# Patient Record
Sex: MALE | Race: WHITE | NOT HISPANIC OR LATINO | Employment: OTHER | URBAN - METROPOLITAN AREA
[De-identification: names, ages, dates, MRNs, and addresses within clinical notes are randomized per-mention and may not be internally consistent; named-entity substitution may affect disease eponyms.]

---

## 2017-01-01 ENCOUNTER — HOSPITAL ENCOUNTER (INPATIENT)
Facility: HOSPITAL | Age: 82
LOS: 12 days | End: 2017-06-27
Attending: FAMILY MEDICINE | Admitting: FAMILY MEDICINE

## 2017-01-01 ENCOUNTER — APPOINTMENT (OUTPATIENT)
Dept: GENERAL RADIOLOGY | Facility: HOSPITAL | Age: 82
End: 2017-01-01

## 2017-01-01 ENCOUNTER — HOSPITAL ENCOUNTER (INPATIENT)
Facility: HOSPITAL | Age: 82
LOS: 4 days | End: 2017-06-15
Attending: EMERGENCY MEDICINE | Admitting: FAMILY MEDICINE

## 2017-01-01 ENCOUNTER — APPOINTMENT (OUTPATIENT)
Dept: CT IMAGING | Facility: HOSPITAL | Age: 82
End: 2017-01-01

## 2017-01-01 VITALS
TEMPERATURE: 97.6 F | OXYGEN SATURATION: 93 % | HEIGHT: 72 IN | DIASTOLIC BLOOD PRESSURE: 88 MMHG | RESPIRATION RATE: 18 BRPM | WEIGHT: 215.8 LBS | BODY MASS INDEX: 29.23 KG/M2 | SYSTOLIC BLOOD PRESSURE: 124 MMHG | HEART RATE: 60 BPM

## 2017-01-01 VITALS
SYSTOLIC BLOOD PRESSURE: 135 MMHG | DIASTOLIC BLOOD PRESSURE: 104 MMHG | TEMPERATURE: 97.9 F | WEIGHT: 191 LBS | BODY MASS INDEX: 25.87 KG/M2 | RESPIRATION RATE: 22 BRPM | OXYGEN SATURATION: 92 % | HEART RATE: 90 BPM | HEIGHT: 72 IN

## 2017-01-01 DIAGNOSIS — R09.02 HYPOXIA: Primary | ICD-10-CM

## 2017-01-01 DIAGNOSIS — Z74.09 IMPAIRED FUNCTIONAL MOBILITY, BALANCE, GAIT, AND ENDURANCE: ICD-10-CM

## 2017-01-01 DIAGNOSIS — R13.10 DYSPHAGIA, UNSPECIFIED: ICD-10-CM

## 2017-01-01 DIAGNOSIS — J18.9 PNEUMONIA OF RIGHT LOWER LOBE DUE TO INFECTIOUS ORGANISM: ICD-10-CM

## 2017-01-01 LAB
ALBUMIN SERPL-MCNC: 3.5 G/DL (ref 3.2–4.8)
ALBUMIN SERPL-MCNC: 3.7 G/DL (ref 3.2–4.8)
ALBUMIN/GLOB SERPL: 1.1 G/DL (ref 1.5–2.5)
ALBUMIN/GLOB SERPL: 1.1 G/DL (ref 1.5–2.5)
ALP SERPL-CCNC: 59 U/L (ref 25–100)
ALP SERPL-CCNC: 67 U/L (ref 25–100)
ALT SERPL W P-5'-P-CCNC: 11 U/L (ref 7–40)
ALT SERPL W P-5'-P-CCNC: 2 U/L (ref 7–40)
ANION GAP SERPL CALCULATED.3IONS-SCNC: 2 MMOL/L (ref 3–11)
ANION GAP SERPL CALCULATED.3IONS-SCNC: 3 MMOL/L (ref 3–11)
ANION GAP SERPL CALCULATED.3IONS-SCNC: 6 MMOL/L (ref 3–11)
AST SERPL-CCNC: 11 U/L (ref 0–33)
AST SERPL-CCNC: 16 U/L (ref 0–33)
BACTERIA SPEC AEROBE CULT: NORMAL
BACTERIA SPEC AEROBE CULT: NORMAL
BASOPHILS # BLD AUTO: 0.02 10*3/MM3 (ref 0–0.2)
BASOPHILS # BLD AUTO: 0.02 10*3/MM3 (ref 0–0.2)
BASOPHILS # BLD AUTO: 0.03 10*3/MM3 (ref 0–0.2)
BASOPHILS NFR BLD AUTO: 0.2 % (ref 0–1)
BASOPHILS NFR BLD AUTO: 0.2 % (ref 0–1)
BASOPHILS NFR BLD AUTO: 0.3 % (ref 0–1)
BILIRUB SERPL-MCNC: 0.8 MG/DL (ref 0.3–1.2)
BILIRUB SERPL-MCNC: 1 MG/DL (ref 0.3–1.2)
BILIRUB UR QL STRIP: ABNORMAL
BNP SERPL-MCNC: 18 PG/ML (ref 0–100)
BUN BLD-MCNC: 12 MG/DL (ref 9–23)
BUN BLD-MCNC: 15 MG/DL (ref 9–23)
BUN BLD-MCNC: 9 MG/DL (ref 9–23)
BUN/CREAT SERPL: 12.9 (ref 7–25)
BUN/CREAT SERPL: 15 (ref 7–25)
BUN/CREAT SERPL: 18.8 (ref 7–25)
CALCIUM SPEC-SCNC: 8.7 MG/DL (ref 8.7–10.4)
CALCIUM SPEC-SCNC: 9 MG/DL (ref 8.7–10.4)
CALCIUM SPEC-SCNC: 9.1 MG/DL (ref 8.7–10.4)
CHLORIDE SERPL-SCNC: 104 MMOL/L (ref 99–109)
CHLORIDE SERPL-SCNC: 107 MMOL/L (ref 99–109)
CHLORIDE SERPL-SCNC: 108 MMOL/L (ref 99–109)
CLARITY UR: CLEAR
CO2 SERPL-SCNC: 28 MMOL/L (ref 20–31)
CO2 SERPL-SCNC: 28 MMOL/L (ref 20–31)
CO2 SERPL-SCNC: 31 MMOL/L (ref 20–31)
COLOR UR: ABNORMAL
CREAT BLD-MCNC: 0.7 MG/DL (ref 0.6–1.3)
CREAT BLD-MCNC: 0.8 MG/DL (ref 0.6–1.3)
CREAT BLD-MCNC: 0.8 MG/DL (ref 0.6–1.3)
D-LACTATE SERPL-SCNC: 1.1 MMOL/L (ref 0.5–2)
DEPRECATED RDW RBC AUTO: 48 FL (ref 37–54)
DEPRECATED RDW RBC AUTO: 48.5 FL (ref 37–54)
DEPRECATED RDW RBC AUTO: 48.8 FL (ref 37–54)
EOSINOPHIL # BLD AUTO: 0.24 10*3/MM3 (ref 0.1–0.3)
EOSINOPHIL # BLD AUTO: 0.29 10*3/MM3 (ref 0.1–0.3)
EOSINOPHIL # BLD AUTO: 0.5 10*3/MM3 (ref 0.1–0.3)
EOSINOPHIL NFR BLD AUTO: 2.1 % (ref 0–3)
EOSINOPHIL NFR BLD AUTO: 2.7 % (ref 0–3)
EOSINOPHIL NFR BLD AUTO: 5.1 % (ref 0–3)
ERYTHROCYTE [DISTWIDTH] IN BLOOD BY AUTOMATED COUNT: 13.5 % (ref 11.3–14.5)
ERYTHROCYTE [DISTWIDTH] IN BLOOD BY AUTOMATED COUNT: 13.5 % (ref 11.3–14.5)
ERYTHROCYTE [DISTWIDTH] IN BLOOD BY AUTOMATED COUNT: 13.7 % (ref 11.3–14.5)
GFR SERPL CREATININE-BSD FRML MDRD: 106 ML/MIN/1.73
GFR SERPL CREATININE-BSD FRML MDRD: 91 ML/MIN/1.73
GFR SERPL CREATININE-BSD FRML MDRD: 91 ML/MIN/1.73
GLOBULIN UR ELPH-MCNC: 3.3 GM/DL
GLOBULIN UR ELPH-MCNC: 3.3 GM/DL
GLUCOSE BLD-MCNC: 129 MG/DL (ref 70–100)
GLUCOSE BLD-MCNC: 87 MG/DL (ref 70–100)
GLUCOSE BLD-MCNC: 88 MG/DL (ref 70–100)
GLUCOSE UR STRIP-MCNC: NEGATIVE MG/DL
HCT VFR BLD AUTO: 38.9 % (ref 38.9–50.9)
HCT VFR BLD AUTO: 40.3 % (ref 38.9–50.9)
HCT VFR BLD AUTO: 42.1 % (ref 38.9–50.9)
HGB BLD-MCNC: 12.5 G/DL (ref 13.1–17.5)
HGB BLD-MCNC: 13.4 G/DL (ref 13.1–17.5)
HGB BLD-MCNC: 13.6 G/DL (ref 13.1–17.5)
HGB UR QL STRIP.AUTO: NEGATIVE
IMM GRANULOCYTES # BLD: 0.01 10*3/MM3 (ref 0–0.03)
IMM GRANULOCYTES # BLD: 0.02 10*3/MM3 (ref 0–0.03)
IMM GRANULOCYTES # BLD: 0.03 10*3/MM3 (ref 0–0.03)
IMM GRANULOCYTES NFR BLD: 0.1 % (ref 0–0.6)
IMM GRANULOCYTES NFR BLD: 0.2 % (ref 0–0.6)
IMM GRANULOCYTES NFR BLD: 0.3 % (ref 0–0.6)
INR PPP: 1.11
KETONES UR QL STRIP: ABNORMAL
LEUKOCYTE ESTERASE UR QL STRIP.AUTO: NEGATIVE
LYMPHOCYTES # BLD AUTO: 2 10*3/MM3 (ref 0.6–4.8)
LYMPHOCYTES # BLD AUTO: 2.5 10*3/MM3 (ref 0.6–4.8)
LYMPHOCYTES # BLD AUTO: 2.96 10*3/MM3 (ref 0.6–4.8)
LYMPHOCYTES NFR BLD AUTO: 17.1 % (ref 24–44)
LYMPHOCYTES NFR BLD AUTO: 25.3 % (ref 24–44)
LYMPHOCYTES NFR BLD AUTO: 27.1 % (ref 24–44)
MAGNESIUM SERPL-MCNC: 1.8 MG/DL (ref 1.3–2.7)
MCH RBC QN AUTO: 31.4 PG (ref 27–31)
MCH RBC QN AUTO: 31.9 PG (ref 27–31)
MCH RBC QN AUTO: 32.1 PG (ref 27–31)
MCHC RBC AUTO-ENTMCNC: 32.1 G/DL (ref 32–36)
MCHC RBC AUTO-ENTMCNC: 32.3 G/DL (ref 32–36)
MCHC RBC AUTO-ENTMCNC: 33.3 G/DL (ref 32–36)
MCV RBC AUTO: 96.6 FL (ref 80–99)
MCV RBC AUTO: 97.7 FL (ref 80–99)
MCV RBC AUTO: 98.6 FL (ref 80–99)
MONOCYTES # BLD AUTO: 0.56 10*3/MM3 (ref 0–1)
MONOCYTES # BLD AUTO: 0.6 10*3/MM3 (ref 0–1)
MONOCYTES # BLD AUTO: 0.8 10*3/MM3 (ref 0–1)
MONOCYTES NFR BLD AUTO: 5.1 % (ref 0–12)
MONOCYTES NFR BLD AUTO: 5.1 % (ref 0–12)
MONOCYTES NFR BLD AUTO: 8.1 % (ref 0–12)
NEUTROPHILS # BLD AUTO: 6.05 10*3/MM3 (ref 1.5–8.3)
NEUTROPHILS # BLD AUTO: 7.06 10*3/MM3 (ref 1.5–8.3)
NEUTROPHILS # BLD AUTO: 8.81 10*3/MM3 (ref 1.5–8.3)
NEUTROPHILS NFR BLD AUTO: 61.1 % (ref 41–71)
NEUTROPHILS NFR BLD AUTO: 64.7 % (ref 41–71)
NEUTROPHILS NFR BLD AUTO: 75.2 % (ref 41–71)
NITRITE UR QL STRIP: NEGATIVE
PH UR STRIP.AUTO: 5.5 [PH] (ref 5–8)
PLATELET # BLD AUTO: 139 10*3/MM3 (ref 150–450)
PLATELET # BLD AUTO: 153 10*3/MM3 (ref 150–450)
PLATELET # BLD AUTO: 178 10*3/MM3 (ref 150–450)
PMV BLD AUTO: 8.9 FL (ref 6–12)
PMV BLD AUTO: 9 FL (ref 6–12)
PMV BLD AUTO: 9 FL (ref 6–12)
POTASSIUM BLD-SCNC: 3.6 MMOL/L (ref 3.5–5.5)
POTASSIUM BLD-SCNC: 3.6 MMOL/L (ref 3.5–5.5)
POTASSIUM BLD-SCNC: 3.7 MMOL/L (ref 3.5–5.5)
PROT SERPL-MCNC: 6.8 G/DL (ref 5.7–8.2)
PROT SERPL-MCNC: 7 G/DL (ref 5.7–8.2)
PROT UR QL STRIP: NEGATIVE
PROTHROMBIN TIME: 12.1 SECONDS (ref 9.6–11.5)
RBC # BLD AUTO: 3.98 10*6/MM3 (ref 4.2–5.76)
RBC # BLD AUTO: 4.17 10*6/MM3 (ref 4.2–5.76)
RBC # BLD AUTO: 4.27 10*6/MM3 (ref 4.2–5.76)
SODIUM BLD-SCNC: 138 MMOL/L (ref 132–146)
SODIUM BLD-SCNC: 138 MMOL/L (ref 132–146)
SODIUM BLD-SCNC: 141 MMOL/L (ref 132–146)
SP GR UR STRIP: 1.03 (ref 1–1.03)
TROPONIN I SERPL-MCNC: 0 NG/ML (ref 0–0.07)
UROBILINOGEN UR QL STRIP: ABNORMAL
VANCOMYCIN TROUGH SERPL-MCNC: 14.3 MCG/ML (ref 10–20)
WBC NRBC COR # BLD: 10.91 10*3/MM3 (ref 3.5–10.8)
WBC NRBC COR # BLD: 11.7 10*3/MM3 (ref 3.5–10.8)
WBC NRBC COR # BLD: 9.89 10*3/MM3 (ref 3.5–10.8)

## 2017-01-01 PROCEDURE — 25010000002 MORPHINE SULFATE (PF) 2 MG/ML SOLUTION: Performed by: NURSE PRACTITIONER

## 2017-01-01 PROCEDURE — 25010000002 ENOXAPARIN PER 10 MG

## 2017-01-01 PROCEDURE — 25010000002 KETOROLAC TROMETHAMINE PER 15 MG: Performed by: NURSE PRACTITIONER

## 2017-01-01 PROCEDURE — 25010000002 VANCOMYCIN

## 2017-01-01 PROCEDURE — 25010000002 LORAZEPAM PER 2 MG: Performed by: NURSE PRACTITIONER

## 2017-01-01 PROCEDURE — 94799 UNLISTED PULMONARY SVC/PX: CPT

## 2017-01-01 PROCEDURE — 99238 HOSP IP/OBS DSCHRG MGMT 30/<: CPT | Performed by: FAMILY MEDICINE

## 2017-01-01 PROCEDURE — 94640 AIRWAY INHALATION TREATMENT: CPT

## 2017-01-01 PROCEDURE — 99232 SBSQ HOSP IP/OBS MODERATE 35: CPT | Performed by: FAMILY MEDICINE

## 2017-01-01 PROCEDURE — 25010000002 PIPERACILLIN-TAZOBACTAM: Performed by: INTERNAL MEDICINE

## 2017-01-01 PROCEDURE — 92526 ORAL FUNCTION THERAPY: CPT

## 2017-01-01 PROCEDURE — 99285 EMERGENCY DEPT VISIT HI MDM: CPT

## 2017-01-01 PROCEDURE — G8978 MOBILITY CURRENT STATUS: HCPCS

## 2017-01-01 PROCEDURE — 70450 CT HEAD/BRAIN W/O DYE: CPT

## 2017-01-01 PROCEDURE — 87040 BLOOD CULTURE FOR BACTERIA: CPT | Performed by: PHYSICIAN ASSISTANT

## 2017-01-01 PROCEDURE — 94760 N-INVAS EAR/PLS OXIMETRY 1: CPT

## 2017-01-01 PROCEDURE — 81003 URINALYSIS AUTO W/O SCOPE: CPT | Performed by: NURSE PRACTITIONER

## 2017-01-01 PROCEDURE — 92610 EVALUATE SWALLOWING FUNCTION: CPT

## 2017-01-01 PROCEDURE — P9612 CATHETERIZE FOR URINE SPEC: HCPCS

## 2017-01-01 PROCEDURE — 85025 COMPLETE CBC W/AUTO DIFF WBC: CPT | Performed by: PHYSICIAN ASSISTANT

## 2017-01-01 PROCEDURE — 71010 HC CHEST PA OR AP: CPT

## 2017-01-01 PROCEDURE — G8979 MOBILITY GOAL STATUS: HCPCS

## 2017-01-01 PROCEDURE — 83735 ASSAY OF MAGNESIUM: CPT | Performed by: FAMILY MEDICINE

## 2017-01-01 PROCEDURE — 93005 ELECTROCARDIOGRAM TRACING: CPT | Performed by: PHYSICIAN ASSISTANT

## 2017-01-01 PROCEDURE — 85025 COMPLETE CBC W/AUTO DIFF WBC: CPT | Performed by: FAMILY MEDICINE

## 2017-01-01 PROCEDURE — 80202 ASSAY OF VANCOMYCIN: CPT

## 2017-01-01 PROCEDURE — 85025 COMPLETE CBC W/AUTO DIFF WBC: CPT | Performed by: INTERNAL MEDICINE

## 2017-01-01 PROCEDURE — 36415 COLL VENOUS BLD VENIPUNCTURE: CPT

## 2017-01-01 PROCEDURE — 85610 PROTHROMBIN TIME: CPT | Performed by: PHYSICIAN ASSISTANT

## 2017-01-01 PROCEDURE — 83605 ASSAY OF LACTIC ACID: CPT | Performed by: PHYSICIAN ASSISTANT

## 2017-01-01 PROCEDURE — 80048 BASIC METABOLIC PNL TOTAL CA: CPT | Performed by: FAMILY MEDICINE

## 2017-01-01 PROCEDURE — 84484 ASSAY OF TROPONIN QUANT: CPT

## 2017-01-01 PROCEDURE — 99223 1ST HOSP IP/OBS HIGH 75: CPT | Performed by: INTERNAL MEDICINE

## 2017-01-01 PROCEDURE — 80053 COMPREHEN METABOLIC PANEL: CPT | Performed by: PHYSICIAN ASSISTANT

## 2017-01-01 PROCEDURE — 25010000002 PIPERACILLIN-TAZOBACTAM: Performed by: PHYSICIAN ASSISTANT

## 2017-01-01 PROCEDURE — 83880 ASSAY OF NATRIURETIC PEPTIDE: CPT | Performed by: PHYSICIAN ASSISTANT

## 2017-01-01 PROCEDURE — 80053 COMPREHEN METABOLIC PANEL: CPT | Performed by: INTERNAL MEDICINE

## 2017-01-01 PROCEDURE — 99232 SBSQ HOSP IP/OBS MODERATE 35: CPT | Performed by: NURSE PRACTITIONER

## 2017-01-01 PROCEDURE — 97162 PT EVAL MOD COMPLEX 30 MIN: CPT

## 2017-01-01 PROCEDURE — 25010000002 VANCOMYCIN HCL IN NACL 2-0.9 GM/500ML-% SOLUTION: Performed by: PHYSICIAN ASSISTANT

## 2017-01-01 RX ORDER — SODIUM CHLORIDE 0.9 % (FLUSH) 0.9 %
1-10 SYRINGE (ML) INJECTION AS NEEDED
Status: CANCELLED | OUTPATIENT
Start: 2017-01-01

## 2017-01-01 RX ORDER — GLYCOPYRROLATE 0.2 MG/ML
0.4 INJECTION INTRAMUSCULAR; INTRAVENOUS ONCE
Status: DISCONTINUED | OUTPATIENT
Start: 2017-01-01 | End: 2017-01-01 | Stop reason: HOSPADM

## 2017-01-01 RX ORDER — NIACIN 100 MG
100 TABLET ORAL
COMMUNITY

## 2017-01-01 RX ORDER — DOCUSATE SODIUM 100 MG/1
100 CAPSULE, LIQUID FILLED ORAL 2 TIMES DAILY
Status: DISCONTINUED | OUTPATIENT
Start: 2017-01-01 | End: 2017-01-01

## 2017-01-01 RX ORDER — LORAZEPAM 2 MG/ML
0.25 INJECTION INTRAMUSCULAR EVERY 4 HOURS PRN
Status: DISCONTINUED | OUTPATIENT
Start: 2017-01-01 | End: 2017-01-01 | Stop reason: HOSPADM

## 2017-01-01 RX ORDER — FAMOTIDINE 20 MG/1
20 TABLET, FILM COATED ORAL 2 TIMES DAILY
Status: DISCONTINUED | OUTPATIENT
Start: 2017-01-01 | End: 2017-01-01

## 2017-01-01 RX ORDER — DOCUSATE SODIUM 100 MG/1
100 CAPSULE, LIQUID FILLED ORAL 2 TIMES DAILY
Status: DISCONTINUED | OUTPATIENT
Start: 2017-01-01 | End: 2017-01-01 | Stop reason: HOSPADM

## 2017-01-01 RX ORDER — ACETAMINOPHEN 325 MG/1
650 TABLET ORAL EVERY 6 HOURS PRN
Status: DISCONTINUED | OUTPATIENT
Start: 2017-01-01 | End: 2017-01-01 | Stop reason: HOSPADM

## 2017-01-01 RX ORDER — ASPIRIN 81 MG/1
81 TABLET, CHEWABLE ORAL DAILY
COMMUNITY

## 2017-01-01 RX ORDER — POLYVINYL ALCOHOL 14 MG/ML
2 SOLUTION/ DROPS OPHTHALMIC 2 TIMES DAILY
Status: DISCONTINUED | OUTPATIENT
Start: 2017-01-01 | End: 2017-01-01 | Stop reason: HOSPADM

## 2017-01-01 RX ORDER — GLYCOPYRROLATE 0.2 MG/ML
0.4 INJECTION INTRAMUSCULAR; INTRAVENOUS EVERY 8 HOURS SCHEDULED
Status: DISCONTINUED | OUTPATIENT
Start: 2017-01-01 | End: 2017-01-01 | Stop reason: HOSPADM

## 2017-01-01 RX ORDER — MEMANTINE HYDROCHLORIDE 10 MG/1
10 TABLET ORAL 2 TIMES DAILY
Status: DISCONTINUED | OUTPATIENT
Start: 2017-01-01 | End: 2017-01-01

## 2017-01-01 RX ORDER — ONDANSETRON 4 MG/1
4 TABLET, FILM COATED ORAL EVERY 6 HOURS PRN
Status: DISCONTINUED | OUTPATIENT
Start: 2017-01-01 | End: 2017-01-01 | Stop reason: HOSPADM

## 2017-01-01 RX ORDER — VANCOMYCIN 2 GRAM/500 ML IN 0.9 % SODIUM CHLORIDE INTRAVENOUS
20 ONCE
Status: COMPLETED | OUTPATIENT
Start: 2017-01-01 | End: 2017-01-01

## 2017-01-01 RX ORDER — DONEPEZIL HYDROCHLORIDE 10 MG/1
10 TABLET, FILM COATED ORAL NIGHTLY
COMMUNITY

## 2017-01-01 RX ORDER — NYSTATIN 100000 [USP'U]/G
POWDER TOPICAL EVERY 8 HOURS SCHEDULED
Status: DISCONTINUED | OUTPATIENT
Start: 2017-01-01 | End: 2017-01-01 | Stop reason: HOSPADM

## 2017-01-01 RX ORDER — CITALOPRAM 20 MG/1
20 TABLET ORAL DAILY
Status: CANCELLED | OUTPATIENT
Start: 2017-01-01

## 2017-01-01 RX ORDER — MORPHINE SULFATE 2 MG/ML
2 INJECTION, SOLUTION INTRAMUSCULAR; INTRAVENOUS
Status: DISCONTINUED | OUTPATIENT
Start: 2017-01-01 | End: 2017-01-01 | Stop reason: HOSPADM

## 2017-01-01 RX ORDER — FINASTERIDE 5 MG/1
5 TABLET, FILM COATED ORAL DAILY
COMMUNITY

## 2017-01-01 RX ORDER — ONDANSETRON 2 MG/ML
4 INJECTION INTRAMUSCULAR; INTRAVENOUS EVERY 6 HOURS PRN
Status: DISCONTINUED | OUTPATIENT
Start: 2017-01-01 | End: 2017-01-01 | Stop reason: HOSPADM

## 2017-01-01 RX ORDER — ACETAMINOPHEN 325 MG/1
650 TABLET ORAL EVERY 6 HOURS PRN
Status: CANCELLED | OUTPATIENT
Start: 2017-01-01

## 2017-01-01 RX ORDER — MORPHINE SULFATE 2 MG/ML
2 INJECTION, SOLUTION INTRAMUSCULAR; INTRAVENOUS EVERY 4 HOURS
Status: DISCONTINUED | OUTPATIENT
Start: 2017-01-01 | End: 2017-01-01

## 2017-01-01 RX ORDER — DOCUSATE SODIUM 100 MG/1
100 CAPSULE, LIQUID FILLED ORAL 2 TIMES DAILY
Status: CANCELLED | OUTPATIENT
Start: 2017-01-01

## 2017-01-01 RX ORDER — ASPIRIN 81 MG/1
81 TABLET, CHEWABLE ORAL DAILY
Status: DISCONTINUED | OUTPATIENT
Start: 2017-01-01 | End: 2017-01-01

## 2017-01-01 RX ORDER — MEMANTINE HYDROCHLORIDE 10 MG/1
10 TABLET ORAL 2 TIMES DAILY
COMMUNITY

## 2017-01-01 RX ORDER — IPRATROPIUM BROMIDE AND ALBUTEROL SULFATE 2.5; .5 MG/3ML; MG/3ML
3 SOLUTION RESPIRATORY (INHALATION) ONCE
Status: COMPLETED | OUTPATIENT
Start: 2017-01-01 | End: 2017-01-01

## 2017-01-01 RX ORDER — ACETAMINOPHEN 650 MG/1
650 SUPPOSITORY RECTAL EVERY 6 HOURS PRN
Status: DISCONTINUED | OUTPATIENT
Start: 2017-01-01 | End: 2017-01-01 | Stop reason: HOSPADM

## 2017-01-01 RX ORDER — TERAZOSIN 2 MG/1
2 CAPSULE ORAL NIGHTLY
Status: DISCONTINUED | OUTPATIENT
Start: 2017-01-01 | End: 2017-01-01 | Stop reason: HOSPADM

## 2017-01-01 RX ORDER — VANCOMYCIN 2 GRAM/500 ML IN 0.9 % SODIUM CHLORIDE INTRAVENOUS
20 ONCE
Status: DISCONTINUED | OUTPATIENT
Start: 2017-01-01 | End: 2017-01-01

## 2017-01-01 RX ORDER — ONDANSETRON 4 MG/1
4 TABLET, FILM COATED ORAL EVERY 6 HOURS PRN
Status: CANCELLED | OUTPATIENT
Start: 2017-01-01

## 2017-01-01 RX ORDER — IPRATROPIUM BROMIDE AND ALBUTEROL SULFATE 2.5; .5 MG/3ML; MG/3ML
3 SOLUTION RESPIRATORY (INHALATION) EVERY 6 HOURS PRN
Status: DISCONTINUED | OUTPATIENT
Start: 2017-01-01 | End: 2017-01-01 | Stop reason: HOSPADM

## 2017-01-01 RX ORDER — SODIUM CHLORIDE 0.9 % (FLUSH) 0.9 %
10 SYRINGE (ML) INJECTION AS NEEDED
Status: DISCONTINUED | OUTPATIENT
Start: 2017-01-01 | End: 2017-01-01 | Stop reason: HOSPADM

## 2017-01-01 RX ORDER — MORPHINE SULFATE 2 MG/ML
2 INJECTION, SOLUTION INTRAMUSCULAR; INTRAVENOUS 2 TIMES DAILY
Status: DISCONTINUED | OUTPATIENT
Start: 2017-01-01 | End: 2017-01-01

## 2017-01-01 RX ORDER — MORPHINE SULFATE 2 MG/ML
2 INJECTION, SOLUTION INTRAMUSCULAR; INTRAVENOUS
Status: CANCELLED | OUTPATIENT
Start: 2017-01-01 | End: 2017-01-01

## 2017-01-01 RX ORDER — BISACODYL 10 MG
10 SUPPOSITORY, RECTAL RECTAL DAILY
Status: DISCONTINUED | OUTPATIENT
Start: 2017-01-01 | End: 2017-01-01

## 2017-01-01 RX ORDER — ONDANSETRON 2 MG/ML
4 INJECTION INTRAMUSCULAR; INTRAVENOUS EVERY 6 HOURS PRN
Status: CANCELLED | OUTPATIENT
Start: 2017-01-01

## 2017-01-01 RX ORDER — BISACODYL 10 MG
10 SUPPOSITORY, RECTAL RECTAL DAILY PRN
Status: DISCONTINUED | OUTPATIENT
Start: 2017-01-01 | End: 2017-01-01

## 2017-01-01 RX ORDER — BISACODYL 10 MG
10 SUPPOSITORY, RECTAL RECTAL DAILY PRN
Status: DISCONTINUED | OUTPATIENT
Start: 2017-01-01 | End: 2017-01-01 | Stop reason: HOSPADM

## 2017-01-01 RX ORDER — SODIUM CHLORIDE 9 MG/ML
50 INJECTION, SOLUTION INTRAVENOUS CONTINUOUS
Status: ACTIVE | OUTPATIENT
Start: 2017-01-01 | End: 2017-01-01

## 2017-01-01 RX ORDER — KETOROLAC TROMETHAMINE 15 MG/ML
15 INJECTION, SOLUTION INTRAMUSCULAR; INTRAVENOUS EVERY 6 HOURS PRN
Status: DISCONTINUED | OUTPATIENT
Start: 2017-01-01 | End: 2017-01-01

## 2017-01-01 RX ORDER — MORPHINE SULFATE 2 MG/ML
2 INJECTION, SOLUTION INTRAMUSCULAR; INTRAVENOUS EVERY 4 HOURS
Status: DISCONTINUED | OUTPATIENT
Start: 2017-01-01 | End: 2017-01-01 | Stop reason: HOSPADM

## 2017-01-01 RX ORDER — DONEPEZIL HYDROCHLORIDE 10 MG/1
10 TABLET, FILM COATED ORAL NIGHTLY
Status: DISCONTINUED | OUTPATIENT
Start: 2017-01-01 | End: 2017-01-01

## 2017-01-01 RX ORDER — IPRATROPIUM BROMIDE AND ALBUTEROL SULFATE 2.5; .5 MG/3ML; MG/3ML
3 SOLUTION RESPIRATORY (INHALATION) EVERY 6 HOURS PRN
Status: CANCELLED | OUTPATIENT
Start: 2017-01-01

## 2017-01-01 RX ORDER — LORAZEPAM 2 MG/ML
0.25 INJECTION INTRAMUSCULAR EVERY 6 HOURS
Status: DISCONTINUED | OUTPATIENT
Start: 2017-01-01 | End: 2017-01-01 | Stop reason: HOSPADM

## 2017-01-01 RX ORDER — CITALOPRAM 20 MG/1
20 TABLET ORAL DAILY
Status: DISCONTINUED | OUTPATIENT
Start: 2017-01-01 | End: 2017-01-01 | Stop reason: HOSPADM

## 2017-01-01 RX ORDER — GLYCOPYRROLATE 0.2 MG/ML
0.4 INJECTION INTRAMUSCULAR; INTRAVENOUS EVERY 4 HOURS PRN
Status: DISCONTINUED | OUTPATIENT
Start: 2017-01-01 | End: 2017-01-01 | Stop reason: HOSPADM

## 2017-01-01 RX ORDER — LORAZEPAM 2 MG/ML
0.25 INJECTION INTRAMUSCULAR EVERY 4 HOURS PRN
Status: CANCELLED | OUTPATIENT
Start: 2017-01-01 | End: 2017-01-01

## 2017-01-01 RX ORDER — ACETAMINOPHEN 650 MG/1
650 SUPPOSITORY RECTAL EVERY 6 HOURS PRN
Status: CANCELLED | OUTPATIENT
Start: 2017-01-01

## 2017-01-01 RX ORDER — SODIUM CHLORIDE 0.9 % (FLUSH) 0.9 %
1-10 SYRINGE (ML) INJECTION AS NEEDED
Status: DISCONTINUED | OUTPATIENT
Start: 2017-01-01 | End: 2017-01-01 | Stop reason: HOSPADM

## 2017-01-01 RX ORDER — KETOROLAC TROMETHAMINE 15 MG/ML
15 INJECTION, SOLUTION INTRAMUSCULAR; INTRAVENOUS EVERY 6 HOURS PRN
Status: DISPENSED | OUTPATIENT
Start: 2017-01-01 | End: 2017-01-01

## 2017-01-01 RX ORDER — TERAZOSIN 2 MG/1
2 CAPSULE ORAL NIGHTLY
Status: CANCELLED | OUTPATIENT
Start: 2017-01-01

## 2017-01-01 RX ORDER — GLYCOPYRROLATE 0.2 MG/ML
0.4 INJECTION INTRAMUSCULAR; INTRAVENOUS EVERY 4 HOURS PRN
Status: CANCELLED | OUTPATIENT
Start: 2017-01-01

## 2017-01-01 RX ORDER — SODIUM CHLORIDE 9 MG/ML
100 INJECTION, SOLUTION INTRAVENOUS CONTINUOUS
Status: DISCONTINUED | OUTPATIENT
Start: 2017-01-01 | End: 2017-01-01

## 2017-01-01 RX ORDER — TERAZOSIN 2 MG/1
2 CAPSULE ORAL NIGHTLY
Status: DISCONTINUED | OUTPATIENT
Start: 2017-01-01 | End: 2017-01-01

## 2017-01-01 RX ORDER — MORPHINE SULFATE 2 MG/ML
2 INJECTION, SOLUTION INTRAMUSCULAR; INTRAVENOUS DAILY
Status: DISCONTINUED | OUTPATIENT
Start: 2017-01-01 | End: 2017-01-01

## 2017-01-01 RX ORDER — CITALOPRAM 20 MG/1
20 TABLET ORAL DAILY
COMMUNITY

## 2017-01-01 RX ORDER — SODIUM CHLORIDE 0.9 % (FLUSH) 0.9 %
10 SYRINGE (ML) INJECTION AS NEEDED
Status: CANCELLED | OUTPATIENT
Start: 2017-01-01

## 2017-01-01 RX ORDER — DOXAZOSIN 2 MG/1
2 TABLET ORAL NIGHTLY
COMMUNITY

## 2017-01-01 RX ORDER — ACETAMINOPHEN 325 MG/1
650 TABLET ORAL EVERY 4 HOURS PRN
COMMUNITY

## 2017-01-01 RX ORDER — FINASTERIDE 5 MG/1
5 TABLET, FILM COATED ORAL DAILY
Status: DISCONTINUED | OUTPATIENT
Start: 2017-01-01 | End: 2017-01-01

## 2017-01-01 RX ADMIN — POLYVINYL ALCOHOL 2 DROP: 14 SOLUTION/ DROPS OPHTHALMIC at 08:25

## 2017-01-01 RX ADMIN — Medication: at 01:00

## 2017-01-01 RX ADMIN — MORPHINE SULFATE 2 MG: 2 INJECTION, SOLUTION INTRAMUSCULAR; INTRAVENOUS at 22:48

## 2017-01-01 RX ADMIN — TAZOBACTAM SODIUM AND PIPERACILLIN SODIUM 4.5 G: .5; 4 INJECTION, POWDER, LYOPHILIZED, FOR SOLUTION INTRAVENOUS at 17:02

## 2017-01-01 RX ADMIN — VANCOMYCIN HYDROCHLORIDE 1500 MG: 1 INJECTION, POWDER, LYOPHILIZED, FOR SOLUTION INTRAVENOUS at 05:55

## 2017-01-01 RX ADMIN — Medication: at 16:56

## 2017-01-01 RX ADMIN — LORAZEPAM 0.25 MG: 2 INJECTION INTRAMUSCULAR; INTRAVENOUS at 02:31

## 2017-01-01 RX ADMIN — LORAZEPAM 0.25 MG: 2 INJECTION INTRAMUSCULAR; INTRAVENOUS at 08:11

## 2017-01-01 RX ADMIN — SODIUM CHLORIDE 100 ML/HR: 9 INJECTION, SOLUTION INTRAVENOUS at 23:00

## 2017-01-01 RX ADMIN — ROBINUL 0.4 MG: 0.2 INJECTION INTRAMUSCULAR; INTRAVENOUS at 06:31

## 2017-01-01 RX ADMIN — POLYVINYL ALCOHOL 2 DROP: 14 SOLUTION/ DROPS OPHTHALMIC at 09:02

## 2017-01-01 RX ADMIN — Medication: at 06:43

## 2017-01-01 RX ADMIN — MORPHINE SULFATE 2 MG: 2 INJECTION, SOLUTION INTRAMUSCULAR; INTRAVENOUS at 08:23

## 2017-01-01 RX ADMIN — ROBINUL 0.4 MG: 0.2 INJECTION INTRAMUSCULAR; INTRAVENOUS at 15:57

## 2017-01-01 RX ADMIN — Medication: at 13:14

## 2017-01-01 RX ADMIN — Medication: at 19:27

## 2017-01-01 RX ADMIN — ROBINUL 0.4 MG: 0.2 INJECTION INTRAMUSCULAR; INTRAVENOUS at 14:52

## 2017-01-01 RX ADMIN — NYSTATIN: 100000 POWDER TOPICAL at 09:01

## 2017-01-01 RX ADMIN — Medication: at 04:53

## 2017-01-01 RX ADMIN — POLYVINYL ALCOHOL 2 DROP: 14 SOLUTION/ DROPS OPHTHALMIC at 08:10

## 2017-01-01 RX ADMIN — POLYVINYL ALCOHOL 2 DROP: 14 SOLUTION/ DROPS OPHTHALMIC at 08:12

## 2017-01-01 RX ADMIN — BISACODYL 10 MG: 10 SUPPOSITORY RECTAL at 08:28

## 2017-01-01 RX ADMIN — LORAZEPAM 0.25 MG: 2 INJECTION INTRAMUSCULAR; INTRAVENOUS at 22:00

## 2017-01-01 RX ADMIN — ROBINUL 0.4 MG: 0.2 INJECTION INTRAMUSCULAR; INTRAVENOUS at 06:30

## 2017-01-01 RX ADMIN — ROBINUL 0.4 MG: 0.2 INJECTION INTRAMUSCULAR; INTRAVENOUS at 05:27

## 2017-01-01 RX ADMIN — BISACODYL 10 MG: 10 SUPPOSITORY RECTAL at 08:10

## 2017-01-01 RX ADMIN — MORPHINE SULFATE 2 MG: 2 INJECTION, SOLUTION INTRAMUSCULAR; INTRAVENOUS at 16:00

## 2017-01-01 RX ADMIN — Medication: at 05:17

## 2017-01-01 RX ADMIN — MORPHINE SULFATE 2 MG: 2 INJECTION, SOLUTION INTRAMUSCULAR; INTRAVENOUS at 15:05

## 2017-01-01 RX ADMIN — Medication: at 17:56

## 2017-01-01 RX ADMIN — Medication: at 16:32

## 2017-01-01 RX ADMIN — Medication: at 23:52

## 2017-01-01 RX ADMIN — MORPHINE SULFATE 2 MG: 2 INJECTION, SOLUTION INTRAMUSCULAR; INTRAVENOUS at 11:36

## 2017-01-01 RX ADMIN — MORPHINE SULFATE 2 MG: 2 INJECTION, SOLUTION INTRAMUSCULAR; INTRAVENOUS at 14:51

## 2017-01-01 RX ADMIN — ENOXAPARIN SODIUM 40 MG: 40 INJECTION SUBCUTANEOUS at 05:00

## 2017-01-01 RX ADMIN — ROBINUL 0.4 MG: 0.2 INJECTION INTRAMUSCULAR; INTRAVENOUS at 06:29

## 2017-01-01 RX ADMIN — NYSTATIN: 100000 POWDER TOPICAL at 22:00

## 2017-01-01 RX ADMIN — MORPHINE SULFATE 2 MG: 2 INJECTION, SOLUTION INTRAMUSCULAR; INTRAVENOUS at 08:10

## 2017-01-01 RX ADMIN — MORPHINE SULFATE 2 MG: 2 INJECTION, SOLUTION INTRAMUSCULAR; INTRAVENOUS at 10:41

## 2017-01-01 RX ADMIN — Medication: at 23:23

## 2017-01-01 RX ADMIN — MORPHINE SULFATE 2 MG: 2 INJECTION, SOLUTION INTRAMUSCULAR; INTRAVENOUS at 06:48

## 2017-01-01 RX ADMIN — MORPHINE SULFATE 2 MG: 2 INJECTION, SOLUTION INTRAMUSCULAR; INTRAVENOUS at 14:04

## 2017-01-01 RX ADMIN — TAZOBACTAM SODIUM AND PIPERACILLIN SODIUM 4.5 G: .5; 4 INJECTION, POWDER, LYOPHILIZED, FOR SOLUTION INTRAVENOUS at 18:03

## 2017-01-01 RX ADMIN — LORAZEPAM 0.25 MG: 2 INJECTION INTRAMUSCULAR; INTRAVENOUS at 06:30

## 2017-01-01 RX ADMIN — SODIUM CHLORIDE 125 ML/HR: 9 INJECTION, SOLUTION INTRAVENOUS at 03:52

## 2017-01-01 RX ADMIN — MORPHINE SULFATE 2 MG: 2 INJECTION, SOLUTION INTRAMUSCULAR; INTRAVENOUS at 08:19

## 2017-01-01 RX ADMIN — TAZOBACTAM SODIUM AND PIPERACILLIN SODIUM 4.5 G: .5; 4 INJECTION, POWDER, LYOPHILIZED, FOR SOLUTION INTRAVENOUS at 02:27

## 2017-01-01 RX ADMIN — ROBINUL 0.4 MG: 0.2 INJECTION INTRAMUSCULAR; INTRAVENOUS at 22:13

## 2017-01-01 RX ADMIN — Medication: at 12:20

## 2017-01-01 RX ADMIN — ROBINUL 0.4 MG: 0.2 INJECTION INTRAMUSCULAR; INTRAVENOUS at 20:40

## 2017-01-01 RX ADMIN — MORPHINE SULFATE 2 MG: 2 INJECTION, SOLUTION INTRAMUSCULAR; INTRAVENOUS at 20:38

## 2017-01-01 RX ADMIN — KETOROLAC TROMETHAMINE 15 MG: 15 INJECTION, SOLUTION INTRAMUSCULAR; INTRAVENOUS at 10:33

## 2017-01-01 RX ADMIN — Medication: at 17:04

## 2017-01-01 RX ADMIN — Medication: at 05:24

## 2017-01-01 RX ADMIN — TAZOBACTAM SODIUM AND PIPERACILLIN SODIUM 4.5 G: .5; 4 INJECTION, POWDER, LYOPHILIZED, FOR SOLUTION INTRAVENOUS at 05:21

## 2017-01-01 RX ADMIN — MORPHINE SULFATE 2 MG: 2 INJECTION, SOLUTION INTRAMUSCULAR; INTRAVENOUS at 08:26

## 2017-01-01 RX ADMIN — Medication: at 12:59

## 2017-01-01 RX ADMIN — POLYVINYL ALCOHOL 2 DROP: 14 SOLUTION/ DROPS OPHTHALMIC at 17:24

## 2017-01-01 RX ADMIN — Medication: at 11:35

## 2017-01-01 RX ADMIN — LORAZEPAM 0.25 MG: 2 INJECTION INTRAMUSCULAR; INTRAVENOUS at 13:52

## 2017-01-01 RX ADMIN — POLYVINYL ALCOHOL 2 DROP: 14 SOLUTION/ DROPS OPHTHALMIC at 13:46

## 2017-01-01 RX ADMIN — LORAZEPAM 0.25 MG: 2 INJECTION INTRAMUSCULAR; INTRAVENOUS at 08:27

## 2017-01-01 RX ADMIN — POLYVINYL ALCOHOL 2 DROP: 14 SOLUTION/ DROPS OPHTHALMIC at 17:56

## 2017-01-01 RX ADMIN — ROBINUL 0.4 MG: 0.2 INJECTION INTRAMUSCULAR; INTRAVENOUS at 21:25

## 2017-01-01 RX ADMIN — MORPHINE SULFATE 2 MG: 2 INJECTION, SOLUTION INTRAMUSCULAR; INTRAVENOUS at 09:48

## 2017-01-01 RX ADMIN — MORPHINE SULFATE 2 MG: 2 INJECTION, SOLUTION INTRAMUSCULAR; INTRAVENOUS at 11:49

## 2017-01-01 RX ADMIN — MORPHINE SULFATE 2 MG: 2 INJECTION, SOLUTION INTRAMUSCULAR; INTRAVENOUS at 23:47

## 2017-01-01 RX ADMIN — NYSTATIN: 100000 POWDER TOPICAL at 14:04

## 2017-01-01 RX ADMIN — Medication: at 11:42

## 2017-01-01 RX ADMIN — MORPHINE SULFATE 2 MG: 2 INJECTION, SOLUTION INTRAMUSCULAR; INTRAVENOUS at 04:53

## 2017-01-01 RX ADMIN — LORAZEPAM 0.25 MG: 2 INJECTION INTRAMUSCULAR; INTRAVENOUS at 14:12

## 2017-01-01 RX ADMIN — Medication: at 06:30

## 2017-01-01 RX ADMIN — MORPHINE SULFATE 2 MG: 2 INJECTION, SOLUTION INTRAMUSCULAR; INTRAVENOUS at 22:27

## 2017-01-01 RX ADMIN — VANCOMYCIN HYDROCHLORIDE 1500 MG: 1 INJECTION, POWDER, LYOPHILIZED, FOR SOLUTION INTRAVENOUS at 05:59

## 2017-01-01 RX ADMIN — POLYVINYL ALCOHOL 2 DROP: 14 SOLUTION/ DROPS OPHTHALMIC at 16:32

## 2017-01-01 RX ADMIN — MORPHINE SULFATE 2 MG: 2 INJECTION, SOLUTION INTRAMUSCULAR; INTRAVENOUS at 08:11

## 2017-01-01 RX ADMIN — ENOXAPARIN SODIUM 40 MG: 40 INJECTION SUBCUTANEOUS at 05:55

## 2017-01-01 RX ADMIN — MORPHINE SULFATE 2 MG: 2 INJECTION, SOLUTION INTRAMUSCULAR; INTRAVENOUS at 08:51

## 2017-01-01 RX ADMIN — KETOROLAC TROMETHAMINE 15 MG: 15 INJECTION, SOLUTION INTRAMUSCULAR; INTRAVENOUS at 16:53

## 2017-01-01 RX ADMIN — MORPHINE SULFATE 2 MG: 2 INJECTION, SOLUTION INTRAMUSCULAR; INTRAVENOUS at 08:27

## 2017-01-01 RX ADMIN — Medication 5 ML: at 11:36

## 2017-01-01 RX ADMIN — NYSTATIN: 100000 POWDER TOPICAL at 14:30

## 2017-01-01 RX ADMIN — VANCOMYCIN HYDROCHLORIDE 1500 MG: 1 INJECTION, POWDER, LYOPHILIZED, FOR SOLUTION INTRAVENOUS at 17:02

## 2017-01-01 RX ADMIN — MORPHINE SULFATE 2 MG: 2 INJECTION, SOLUTION INTRAMUSCULAR; INTRAVENOUS at 14:13

## 2017-01-01 RX ADMIN — MORPHINE SULFATE 2 MG: 2 INJECTION, SOLUTION INTRAMUSCULAR; INTRAVENOUS at 08:12

## 2017-01-01 RX ADMIN — LORAZEPAM 0.25 MG: 2 INJECTION INTRAMUSCULAR; INTRAVENOUS at 14:00

## 2017-01-01 RX ADMIN — MORPHINE SULFATE 2 MG: 2 INJECTION, SOLUTION INTRAMUSCULAR; INTRAVENOUS at 01:05

## 2017-01-01 RX ADMIN — ACETAMINOPHEN 650 MG: 650 SUPPOSITORY RECTAL at 13:49

## 2017-01-01 RX ADMIN — Medication: at 12:06

## 2017-01-01 RX ADMIN — POLYVINYL ALCOHOL 2 DROP: 14 SOLUTION/ DROPS OPHTHALMIC at 08:11

## 2017-01-01 RX ADMIN — Medication: at 05:27

## 2017-01-01 RX ADMIN — Medication: at 06:31

## 2017-01-01 RX ADMIN — ROBINUL 0.4 MG: 0.2 INJECTION INTRAMUSCULAR; INTRAVENOUS at 13:43

## 2017-01-01 RX ADMIN — ROBINUL 0.4 MG: 0.2 INJECTION INTRAMUSCULAR; INTRAVENOUS at 13:53

## 2017-01-01 RX ADMIN — Medication: at 17:14

## 2017-01-01 RX ADMIN — ROBINUL 0.4 MG: 0.2 INJECTION INTRAMUSCULAR; INTRAVENOUS at 14:04

## 2017-01-01 RX ADMIN — POLYVINYL ALCOHOL 2 DROP: 14 SOLUTION/ DROPS OPHTHALMIC at 17:14

## 2017-01-01 RX ADMIN — MORPHINE SULFATE 2 MG: 2 INJECTION, SOLUTION INTRAMUSCULAR; INTRAVENOUS at 21:25

## 2017-01-01 RX ADMIN — LORAZEPAM 0.25 MG: 2 INJECTION INTRAMUSCULAR; INTRAVENOUS at 04:57

## 2017-01-01 RX ADMIN — LORAZEPAM 0.25 MG: 2 INJECTION INTRAMUSCULAR; INTRAVENOUS at 14:52

## 2017-01-01 RX ADMIN — Medication: at 13:17

## 2017-01-01 RX ADMIN — NYSTATIN: 100000 POWDER TOPICAL at 15:06

## 2017-01-01 RX ADMIN — Medication: at 21:50

## 2017-01-01 RX ADMIN — MORPHINE SULFATE 2 MG: 2 INJECTION, SOLUTION INTRAMUSCULAR; INTRAVENOUS at 09:58

## 2017-01-01 RX ADMIN — Medication: at 22:48

## 2017-01-01 RX ADMIN — NYSTATIN: 100000 POWDER TOPICAL at 21:12

## 2017-01-01 RX ADMIN — Medication 2000 MG: at 04:56

## 2017-01-01 RX ADMIN — ROBINUL 0.4 MG: 0.2 INJECTION INTRAMUSCULAR; INTRAVENOUS at 14:13

## 2017-01-01 RX ADMIN — MORPHINE SULFATE 2 MG: 2 INJECTION, SOLUTION INTRAMUSCULAR; INTRAVENOUS at 09:02

## 2017-01-01 RX ADMIN — ROBINUL 0.4 MG: 0.2 INJECTION INTRAMUSCULAR; INTRAVENOUS at 13:52

## 2017-01-01 RX ADMIN — POLYVINYL ALCOHOL 2 DROP: 14 SOLUTION/ DROPS OPHTHALMIC at 16:55

## 2017-01-01 RX ADMIN — MORPHINE SULFATE 2 MG: 2 INJECTION, SOLUTION INTRAMUSCULAR; INTRAVENOUS at 10:59

## 2017-01-01 RX ADMIN — LORAZEPAM 0.25 MG: 2 INJECTION INTRAMUSCULAR; INTRAVENOUS at 10:48

## 2017-01-01 RX ADMIN — ROBINUL 0.4 MG: 0.2 INJECTION INTRAMUSCULAR; INTRAVENOUS at 04:52

## 2017-01-01 RX ADMIN — Medication: at 23:51

## 2017-01-01 RX ADMIN — LORAZEPAM 0.25 MG: 2 INJECTION INTRAMUSCULAR; INTRAVENOUS at 15:59

## 2017-01-01 RX ADMIN — MORPHINE SULFATE 2 MG: 2 INJECTION, SOLUTION INTRAMUSCULAR; INTRAVENOUS at 12:57

## 2017-01-01 RX ADMIN — MORPHINE SULFATE 2 MG: 2 INJECTION, SOLUTION INTRAMUSCULAR; INTRAVENOUS at 16:32

## 2017-01-01 RX ADMIN — TAZOBACTAM SODIUM AND PIPERACILLIN SODIUM 4.5 G: .5; 4 INJECTION, POWDER, LYOPHILIZED, FOR SOLUTION INTRAVENOUS at 05:55

## 2017-01-01 RX ADMIN — KETOROLAC TROMETHAMINE 15 MG: 15 INJECTION, SOLUTION INTRAMUSCULAR; INTRAVENOUS at 17:16

## 2017-01-01 RX ADMIN — POLYVINYL ALCOHOL 2 DROP: 14 SOLUTION/ DROPS OPHTHALMIC at 08:51

## 2017-01-01 RX ADMIN — KETOROLAC TROMETHAMINE 15 MG: 15 INJECTION, SOLUTION INTRAMUSCULAR; INTRAVENOUS at 11:35

## 2017-01-01 RX ADMIN — NYSTATIN: 100000 POWDER TOPICAL at 06:29

## 2017-01-01 RX ADMIN — LORAZEPAM 0.25 MG: 2 INJECTION INTRAMUSCULAR; INTRAVENOUS at 15:05

## 2017-01-01 RX ADMIN — TAZOBACTAM SODIUM AND PIPERACILLIN SODIUM 4.5 G: .5; 4 INJECTION, POWDER, LYOPHILIZED, FOR SOLUTION INTRAVENOUS at 13:20

## 2017-01-01 RX ADMIN — NYSTATIN: 100000 POWDER TOPICAL at 05:46

## 2017-01-01 RX ADMIN — ROBINUL 0.4 MG: 0.2 INJECTION INTRAMUSCULAR; INTRAVENOUS at 21:50

## 2017-01-01 RX ADMIN — LORAZEPAM 0.25 MG: 2 INJECTION INTRAMUSCULAR; INTRAVENOUS at 15:57

## 2017-01-01 RX ADMIN — MORPHINE SULFATE 2 MG: 2 INJECTION, SOLUTION INTRAMUSCULAR; INTRAVENOUS at 11:46

## 2017-01-01 RX ADMIN — MORPHINE SULFATE 2 MG: 2 INJECTION, SOLUTION INTRAMUSCULAR; INTRAVENOUS at 04:36

## 2017-01-01 RX ADMIN — Medication: at 18:49

## 2017-01-01 RX ADMIN — MORPHINE SULFATE 2 MG: 2 INJECTION, SOLUTION INTRAMUSCULAR; INTRAVENOUS at 23:20

## 2017-01-01 RX ADMIN — Medication: at 01:17

## 2017-01-01 RX ADMIN — MORPHINE SULFATE 2 MG: 2 INJECTION, SOLUTION INTRAMUSCULAR; INTRAVENOUS at 21:28

## 2017-01-01 RX ADMIN — ROBINUL 0.4 MG: 0.2 INJECTION INTRAMUSCULAR; INTRAVENOUS at 04:58

## 2017-01-01 RX ADMIN — MORPHINE SULFATE 2 MG: 2 INJECTION, SOLUTION INTRAMUSCULAR; INTRAVENOUS at 20:41

## 2017-01-01 RX ADMIN — ROBINUL 0.4 MG: 0.2 INJECTION INTRAMUSCULAR; INTRAVENOUS at 20:38

## 2017-01-01 RX ADMIN — Medication: at 12:30

## 2017-01-01 RX ADMIN — ROBINUL 0.4 MG: 0.2 INJECTION INTRAMUSCULAR; INTRAVENOUS at 21:33

## 2017-01-01 RX ADMIN — Medication: at 15:05

## 2017-01-01 RX ADMIN — ROBINUL 0.4 MG: 0.2 INJECTION INTRAMUSCULAR; INTRAVENOUS at 15:05

## 2017-01-01 RX ADMIN — MORPHINE SULFATE 2 MG: 2 INJECTION, SOLUTION INTRAMUSCULAR; INTRAVENOUS at 21:50

## 2017-01-01 RX ADMIN — MORPHINE SULFATE 2 MG: 2 INJECTION, SOLUTION INTRAMUSCULAR; INTRAVENOUS at 22:11

## 2017-01-01 RX ADMIN — VANCOMYCIN HYDROCHLORIDE 1500 MG: 1 INJECTION, POWDER, LYOPHILIZED, FOR SOLUTION INTRAVENOUS at 18:49

## 2017-01-01 RX ADMIN — MORPHINE SULFATE 2 MG: 2 INJECTION, SOLUTION INTRAMUSCULAR; INTRAVENOUS at 13:52

## 2017-01-01 RX ADMIN — KETOROLAC TROMETHAMINE 15 MG: 15 INJECTION, SOLUTION INTRAMUSCULAR; INTRAVENOUS at 15:57

## 2017-01-01 RX ADMIN — Medication: at 05:46

## 2017-01-01 RX ADMIN — Medication: at 17:24

## 2017-01-01 RX ADMIN — TAZOBACTAM SODIUM AND PIPERACILLIN SODIUM 4.5 G: .5; 4 INJECTION, POWDER, LYOPHILIZED, FOR SOLUTION INTRAVENOUS at 11:58

## 2017-01-01 RX ADMIN — POLYVINYL ALCOHOL 2 DROP: 14 SOLUTION/ DROPS OPHTHALMIC at 08:19

## 2017-01-01 RX ADMIN — POLYVINYL ALCOHOL 2 DROP: 14 SOLUTION/ DROPS OPHTHALMIC at 08:26

## 2017-01-01 RX ADMIN — LORAZEPAM 0.25 MG: 2 INJECTION INTRAMUSCULAR; INTRAVENOUS at 09:48

## 2017-01-01 RX ADMIN — MORPHINE SULFATE 2 MG: 2 INJECTION, SOLUTION INTRAMUSCULAR; INTRAVENOUS at 09:31

## 2017-01-01 RX ADMIN — TAZOBACTAM SODIUM AND PIPERACILLIN SODIUM 4.5 G: .5; 4 INJECTION, POWDER, LYOPHILIZED, FOR SOLUTION INTRAVENOUS at 23:39

## 2017-01-01 RX ADMIN — ROBINUL 0.4 MG: 0.2 INJECTION INTRAMUSCULAR; INTRAVENOUS at 22:48

## 2017-01-01 RX ADMIN — NYSTATIN: 100000 POWDER TOPICAL at 06:31

## 2017-01-01 RX ADMIN — ROBINUL 0.4 MG: 0.2 INJECTION INTRAMUSCULAR; INTRAVENOUS at 06:37

## 2017-01-01 RX ADMIN — ROBINUL 0.4 MG: 0.2 INJECTION INTRAMUSCULAR; INTRAVENOUS at 05:58

## 2017-01-01 RX ADMIN — MORPHINE SULFATE 2 MG: 2 INJECTION, SOLUTION INTRAMUSCULAR; INTRAVENOUS at 12:06

## 2017-01-01 RX ADMIN — ROBINUL 0.4 MG: 0.2 INJECTION INTRAMUSCULAR; INTRAVENOUS at 05:46

## 2017-01-01 RX ADMIN — Medication: at 17:17

## 2017-01-01 RX ADMIN — LORAZEPAM 0.25 MG: 2 INJECTION INTRAMUSCULAR; INTRAVENOUS at 09:59

## 2017-01-01 RX ADMIN — ROBINUL 0.4 MG: 0.2 INJECTION INTRAMUSCULAR; INTRAVENOUS at 14:29

## 2017-01-01 RX ADMIN — ROBINUL 0.4 MG: 0.2 INJECTION INTRAMUSCULAR; INTRAVENOUS at 21:20

## 2017-01-01 RX ADMIN — Medication: at 06:38

## 2017-01-01 RX ADMIN — ROBINUL 0.4 MG: 0.2 INJECTION INTRAMUSCULAR; INTRAVENOUS at 20:29

## 2017-01-01 RX ADMIN — NYSTATIN: 100000 POWDER TOPICAL at 23:36

## 2017-01-01 RX ADMIN — Medication: at 21:25

## 2017-01-01 RX ADMIN — TAZOBACTAM SODIUM AND PIPERACILLIN SODIUM 4.5 G: .5; 4 INJECTION, POWDER, LYOPHILIZED, FOR SOLUTION INTRAVENOUS at 05:01

## 2017-01-01 RX ADMIN — Medication: at 03:58

## 2017-01-01 RX ADMIN — NYSTATIN: 100000 POWDER TOPICAL at 23:20

## 2017-01-01 RX ADMIN — ROBINUL 0.4 MG: 0.2 INJECTION INTRAMUSCULAR; INTRAVENOUS at 22:00

## 2017-01-01 RX ADMIN — TAZOBACTAM SODIUM AND PIPERACILLIN SODIUM 4.5 G: .5; 4 INJECTION, POWDER, LYOPHILIZED, FOR SOLUTION INTRAVENOUS at 23:48

## 2017-01-01 RX ADMIN — POLYVINYL ALCOHOL 2 DROP: 14 SOLUTION/ DROPS OPHTHALMIC at 17:16

## 2017-01-01 RX ADMIN — LORAZEPAM 0.25 MG: 2 INJECTION INTRAMUSCULAR; INTRAVENOUS at 09:42

## 2017-01-01 RX ADMIN — MORPHINE SULFATE 2 MG: 2 INJECTION, SOLUTION INTRAMUSCULAR; INTRAVENOUS at 17:16

## 2017-01-01 RX ADMIN — ROBINUL 0.4 MG: 0.2 INJECTION INTRAMUSCULAR; INTRAVENOUS at 13:20

## 2017-01-01 RX ADMIN — MORPHINE SULFATE 2 MG: 2 INJECTION, SOLUTION INTRAMUSCULAR; INTRAVENOUS at 10:33

## 2017-01-01 RX ADMIN — ROBINUL 0.4 MG: 0.2 INJECTION INTRAMUSCULAR; INTRAVENOUS at 22:11

## 2017-01-01 RX ADMIN — POLYVINYL ALCOHOL 2 DROP: 14 SOLUTION/ DROPS OPHTHALMIC at 17:21

## 2017-01-01 RX ADMIN — ENOXAPARIN SODIUM 40 MG: 40 INJECTION SUBCUTANEOUS at 05:19

## 2017-01-01 RX ADMIN — POLYVINYL ALCOHOL 2 DROP: 14 SOLUTION/ DROPS OPHTHALMIC at 08:28

## 2017-01-01 RX ADMIN — ROBINUL 0.4 MG: 0.2 INJECTION INTRAMUSCULAR; INTRAVENOUS at 12:46

## 2017-01-01 RX ADMIN — TAZOBACTAM SODIUM AND PIPERACILLIN SODIUM 4.5 G: .5; 4 INJECTION, POWDER, LYOPHILIZED, FOR SOLUTION INTRAVENOUS at 05:00

## 2017-01-01 RX ADMIN — VANCOMYCIN HYDROCHLORIDE 1500 MG: 1 INJECTION, POWDER, LYOPHILIZED, FOR SOLUTION INTRAVENOUS at 05:31

## 2017-01-01 RX ADMIN — ROBINUL 0.4 MG: 0.2 INJECTION INTRAMUSCULAR; INTRAVENOUS at 21:00

## 2017-01-01 RX ADMIN — ROBINUL 0.4 MG: 0.2 INJECTION INTRAMUSCULAR; INTRAVENOUS at 05:18

## 2017-01-01 RX ADMIN — IPRATROPIUM BROMIDE AND ALBUTEROL SULFATE 3 ML: .5; 3 SOLUTION RESPIRATORY (INHALATION) at 05:15

## 2017-01-01 RX ADMIN — TAZOBACTAM SODIUM AND PIPERACILLIN SODIUM 4.5 G: .5; 4 INJECTION, POWDER, LYOPHILIZED, FOR SOLUTION INTRAVENOUS at 11:47

## 2017-01-01 RX ADMIN — POLYVINYL ALCOHOL 2 DROP: 14 SOLUTION/ DROPS OPHTHALMIC at 10:00

## 2017-01-01 RX ADMIN — LORAZEPAM 0.25 MG: 2 INJECTION INTRAMUSCULAR; INTRAVENOUS at 21:25

## 2017-01-01 RX ADMIN — MORPHINE SULFATE 2 MG: 2 INJECTION, SOLUTION INTRAMUSCULAR; INTRAVENOUS at 10:22

## 2017-01-01 RX ADMIN — ROBINUL 0.4 MG: 0.2 INJECTION INTRAMUSCULAR; INTRAVENOUS at 14:00

## 2017-01-01 RX ADMIN — MORPHINE SULFATE 2 MG: 2 INJECTION, SOLUTION INTRAMUSCULAR; INTRAVENOUS at 09:29

## 2017-01-01 RX ADMIN — IPRATROPIUM BROMIDE AND ALBUTEROL SULFATE 3 ML: .5; 3 SOLUTION RESPIRATORY (INHALATION) at 04:14

## 2017-01-01 RX ADMIN — ROBINUL 0.4 MG: 0.2 INJECTION INTRAMUSCULAR; INTRAVENOUS at 05:24

## 2017-01-01 RX ADMIN — Medication: at 09:02

## 2017-01-01 RX ADMIN — MORPHINE SULFATE 2 MG: 2 INJECTION, SOLUTION INTRAMUSCULAR; INTRAVENOUS at 21:22

## 2017-01-01 RX ADMIN — Medication: at 17:21

## 2017-01-01 RX ADMIN — VANCOMYCIN HYDROCHLORIDE 1500 MG: 1 INJECTION, POWDER, LYOPHILIZED, FOR SOLUTION INTRAVENOUS at 18:19

## 2017-01-01 RX ADMIN — ACETAMINOPHEN 650 MG: 650 SUPPOSITORY RECTAL at 15:48

## 2017-01-01 RX ADMIN — ROBINUL 0.4 MG: 0.2 INJECTION INTRAMUSCULAR; INTRAVENOUS at 05:14

## 2017-01-01 RX ADMIN — POLYVINYL ALCOHOL 2 DROP: 14 SOLUTION/ DROPS OPHTHALMIC at 17:04

## 2017-01-01 RX ADMIN — TAZOBACTAM SODIUM AND PIPERACILLIN SODIUM 4.5 G: .5; 4 INJECTION, POWDER, LYOPHILIZED, FOR SOLUTION INTRAVENOUS at 17:40

## 2017-01-01 RX ADMIN — ROBINUL 0.4 MG: 0.2 INJECTION INTRAMUSCULAR; INTRAVENOUS at 13:14

## 2017-06-11 PROBLEM — R13.10 DYSPHAGIA: Chronic | Status: ACTIVE | Noted: 2017-01-01

## 2017-06-11 PROBLEM — G20 PARKINSON DISEASE (HCC): Status: ACTIVE | Noted: 2017-01-01

## 2017-06-11 PROBLEM — J18.9 PNEUMONIA: Status: ACTIVE | Noted: 2017-01-01

## 2017-06-11 PROBLEM — J96.01 ACUTE RESPIRATORY FAILURE WITH HYPOXIA (HCC): Status: ACTIVE | Noted: 2017-01-01

## 2017-06-11 PROBLEM — R09.02 HYPOXIA: Status: ACTIVE | Noted: 2017-01-01

## 2017-06-11 NOTE — PROGRESS NOTES
Pharmacokinetic Consult - Vancomycin Dosing    Victor Hugo Dumont is a 88 y.o. male who has been consulted for vancomycin dosing for pneumonia .    Relevant clinical data and objective history reviewed:  Lab Results   Component Value Date/Time    CREATININE 0.80 06/11/2017 02:56 AM    CREATININE 1.00 08/26/2016 05:40 PM    BUN 15 06/11/2017 02:56 AM    BUN 19 08/26/2016 05:40 PM     Estimated Creatinine Clearance: 77.7 mL/min (by C-G formula based on Cr of 0.8).     Lab Results   Component Value Date/Time    WBC 11.70 (H) 06/11/2017 02:56 AM    HGB 13.4 06/11/2017 02:56 AM    HCT 40.3 06/11/2017 02:56 AM    MCV 96.6 06/11/2017 02:56 AM     06/11/2017 02:56 AM     Temp Readings from Last 3 Encounters:   06/11/17 98.1 °F (36.7 °C) (Axillary)     Weight: 218 lb (98.9 kg)  Baseline culture/source/susceptibility: Pending    Assessment/Plan  The patient will be started on vancomycin utilizing scheduled dosing.  Vancomycin 2000 mg IV x 1 (given), then Vancomycin 1500 mg IV Q12H (15 mg/kg/dose).  Plan for trough as patient approaches steady state, prior to the 4th dose (18:00 dose on Monday 6/12).  Due to infection severity, will target a trough of 15-20 ug/mL.  Pharmacy will continue to follow the patient’s culture results and clinical progress daily.    Sandip Lebron RP

## 2017-06-11 NOTE — ED PROVIDER NOTES
Subjective   Patient is a 88 y.o. male presenting with shortness of breath.   History provided by:  EMS personnel and nursing home   used: No    Shortness of Breath   Onset quality:  Gradual  Duration:  4 hours  Timing:  Constant  Progression:  Worsening  Chronicity:  New  Context comment:  Nursing home pt and they stated that temp 100.1F and O2sats in 80's   Relieved by:  Oxygen  Worsened by:  Nothing  Ineffective treatments:  None tried  Associated symptoms: cough, sputum production and wheezing    Associated symptoms: no abdominal pain, no chest pain, no fever, no rash, no sore throat and no vomiting        Review of Systems   Unable to perform ROS: Dementia   Constitutional: Negative for chills and fever.   HENT: Negative for ear discharge, facial swelling, nosebleeds, rhinorrhea and sore throat.    Respiratory: Positive for cough, sputum production, shortness of breath and wheezing.    Cardiovascular: Negative for chest pain and palpitations.   Gastrointestinal: Negative for abdominal pain, diarrhea, nausea and vomiting.   Genitourinary: Negative for dysuria, frequency, hematuria and urgency.   Musculoskeletal: Negative for back pain and neck stiffness.   Skin: Negative for pallor and rash.   Neurological: Negative for dizziness and seizures.   Psychiatric/Behavioral: Negative.    All other systems reviewed and are negative.      Past Medical History:   Diagnosis Date   • Abnormal posture    • Alzheimer disease    • Benign prostatic hypertrophy    • Bronchitis    • Cataracts, bilateral    • Cognitive communication deficit    • Depression    • Dysphagia    • Generalized anxiety disorder    • GERD (gastroesophageal reflux disease)    • Hyperlipidemia    • Malignant neoplasm of skin    • Muscle weakness    • Orthostatic hypotension    • Parkinson disease    • Peripheral neuropathy    • Personal history of transient ischemic attack (TIA), and cerebral infarction without residual deficits    •  Pneumonia    • Risk for falls    • Suicidal ideation    • Suicide attempt    • TIA (transient ischemic attack)    • UTI (urinary tract infection)    • Voice disorder        No Known Allergies    Past Surgical History:   Procedure Laterality Date   • ADENOIDECTOMY     • OTHER SURGICAL HISTORY      LASER REPAIR OF RETINAL TEAR   • TONSILLECTOMY         History reviewed. No pertinent family history.    Social History     Social History   • Marital status:      Spouse name: N/A   • Number of children: N/A   • Years of education: N/A     Social History Main Topics   • Smoking status: Unknown If Ever Smoked   • Smokeless tobacco: None   • Alcohol use No   • Drug use: No   • Sexual activity: Not Asked     Other Topics Concern   • None     Social History Narrative   • None           Objective   Physical Exam   Constitutional: He is oriented to person, place, and time. He appears well-developed and well-nourished.   Arouses to pain.     HENT:   Head: Normocephalic and atraumatic.   Right Ear: External ear normal.   Left Ear: External ear normal.   Nose: Nose normal.   Mouth/Throat: Oropharynx is clear and moist.   Eyes: Conjunctivae and EOM are normal. Pupils are equal, round, and reactive to light. Right eye exhibits no discharge. Left eye exhibits no discharge.   Neck: Normal range of motion. Neck supple. No tracheal deviation present. No thyromegaly present.   Cardiovascular: Normal rate, regular rhythm, normal heart sounds and intact distal pulses.    Pulmonary/Chest: Effort normal. He has wheezes. Rales: RLL anterior and poster.  He exhibits no tenderness.   Abdominal: Soft. Bowel sounds are normal. He exhibits no distension and no mass. There is no tenderness. There is no rebound and no guarding. No hernia.   Musculoskeletal: Normal range of motion. He exhibits no edema, tenderness or deformity.   Neurological: He is alert and oriented to person, place, and time. He has normal reflexes. He displays normal  reflexes. No cranial nerve deficit. He exhibits normal muscle tone. Coordination normal.   Skin: Skin is warm and dry. No erythema.       Procedures         ED Course  ED Course   Value Comment By Time   Temp: (!) 100.6 °F (38.1 °C) (Reviewed) Davide Muse MD 06/11 0421                  MDM  Number of Diagnoses or Management Options  Hypoxia: new and requires workup  Pneumonia of right lower lobe due to infectious organism: new and requires workup  Diagnosis management comments: Discussed with Dr. Yuen.  She'll admit the patient to telemetry.  IV antibiotic severity been started.  O2 sats are up to about 94% on 2 L after a DuoNeb.       Amount and/or Complexity of Data Reviewed  Clinical lab tests: reviewed and ordered  Tests in the radiology section of CPT®: ordered and reviewed  Tests in the medicine section of CPT®: ordered and reviewed  Discuss the patient with other providers: yes    Patient Progress  Patient progress: stable      Final diagnoses:   Hypoxia   Pneumonia of right lower lobe due to infectious organism            TONE Harrell  06/17/17 2205

## 2017-06-11 NOTE — H&P
[facility] Medicine Services  HISTORY AND PHYSICAL    Primary Care Physician: No Known Provider    Subjective     Chief Complaint:  Short of breath    History of Present Illness:   89 yo resident of [facility] was noted to be having respiratory difficulty, temp of 100.1 and was hypoxic with O2 sats in the 80's. He was brought via EMS and has had some rales on auscultation noted along with some upper airway congestion. At baseline he is reportedly oriented to person, but has not responded to stimuli other than to open his eyes briefly since arrival. He is DNR code status per paperwork sent from [facility] signed by his wife. There is not any family present here at the hospital. He has been referred for admission to Hospital Medicine with presumed PNA. Abx started in ED. O2 sats in 90's with 2 liters O2NC.      Review of Systems   Otherwise complete ROS performed and negative except as mentioned in the HPI. Patient unable to give any information due to advanced dementia and illness    Past Medical History:   Diagnosis Date   • Abnormal posture    • Alzheimer disease    • Benign prostatic hypertrophy    • Bronchitis    • Cataracts, bilateral    • Cognitive communication deficit    • Depression    • Dysphagia    • Generalized anxiety disorder    • GERD (gastroesophageal reflux disease)    • Hyperlipidemia    • Malignant neoplasm of skin    • Muscle weakness    • Orthostatic hypotension    • Parkinson disease    • Peripheral neuropathy    • Personal history of transient ischemic attack (TIA), and cerebral infarction without residual deficits    • Pneumonia    • Risk for falls    • Suicidal ideation    • Suicide attempt    • TIA (transient ischemic attack)    • UTI (urinary tract infection)    • Voice disorder        Past Surgical History:   Procedure Laterality Date   • ADENOIDECTOMY     • OTHER SURGICAL HISTORY      LASER REPAIR OF RETINAL TEAR   • TONSILLECTOMY         History reviewed. No  "pertinent family history.    Social History     Social History   • Marital status:      Spouse name: N/A   • Number of children: N/A   • Years of education: N/A     Occupational History   • Not on file.     Social History Main Topics   • Smoking status: Unknown If Ever Smoked   • Smokeless tobacco: Not on file   • Alcohol use No   • Drug use: No   • Sexual activity: Not on file     Other Topics Concern   • Not on file     Social History Narrative   • No narrative on file       Medications:  Prescriptions Prior to Admission   Medication Sig Dispense Refill Last Dose   • acetaminophen (TYLENOL) 325 MG tablet Take 650 mg by mouth Every 4 (Four) Hours As Needed for Mild Pain (1-3).      • aspirin 81 MG chewable tablet Chew 81 mg Daily.      • carbidopa-levodopa (SINEMET)  MG per tablet Take 1 tablet by mouth 4 (Four) Times a Day. GIVE 1.5 TABLETS      • citalopram (CeleXA) 20 MG tablet Take 20 mg by mouth Daily.      • donepezil (ARICEPT) 10 MG tablet Take 10 mg by mouth Every Night.      • doxazosin (CARDURA) 2 MG tablet Take 2 mg by mouth Every Night.      • finasteride (PROSCAR) 5 MG tablet Take 5 mg by mouth Daily.      • memantine (NAMENDA) 10 MG tablet Take 10 mg by mouth 2 (Two) Times a Day.      • niacin 100 MG tablet Take 100 mg by mouth Daily With Breakfast.          Allergies:  No Known Allergies      Objective     Physical Exam:  Vital Signs: /81  Pulse 82  Temp 98.1 °F (36.7 °C) (Axillary)   Resp 18  Ht 72\" (182.9 cm)  Wt 218 lb (98.9 kg)  SpO2 94%  BMI 29.57 kg/m2  Physical Exam   Constitutional:   Frail, ill appearing elderly gentleman laying on stretcher with eyes closed. Minimally responsive to loud voice.    HENT:   Head: Normocephalic and atraumatic.     Eyes slightly matted. Dry mucus membranes.   Eyes: Pupils are equal, round, and reactive to light.   Slight matting.   Neck: No JVD present.   Limited ROM   Cardiovascular: Normal rate, regular rhythm, normal heart sounds and " intact distal pulses.  Exam reveals no gallop and no friction rub.    No murmur heard.  Pulmonary/Chest: No stridor.   Poor respiratory excursion. Scattered bilateral rales.    Abdominal: Soft. Bowel sounds are normal. He exhibits no mass. There is no guarding.   Rounded.   Musculoskeletal: He exhibits no edema, tenderness or deformity.   Neurological:   Minimally responsive to stimuli. Spastic intermittent tremors of RUE. Left sided movements less than right.   Skin: Skin is warm and dry. No rash noted. No erythema. No pallor.   Psychiatric:   Minimally responsive   Nursing note and vitals reviewed.          Results Reviewed:    Results from last 7 days  Lab Units 06/11/17  0256   WBC 10*3/mm3 11.70*   HEMOGLOBIN g/dL 13.4   PLATELETS 10*3/mm3 178       Results from last 7 days  Lab Units 06/11/17  0256   SODIUM mmol/L 138   POTASSIUM mmol/L 3.7   TOTAL CO2 mmol/L 28.0   CREATININE mg/dL 0.80   GLUCOSE mg/dL 129*   CALCIUM mg/dL 9.0       I have personally reviewed and interpreted available lab data, radiology studies and ECG obtained at time of admission.     Assessment / Plan     Assessment/Problem List:   Hospital Problem List     Pneumonia    Parkinson disease    Dysphagia (Chronic)    Hypoxia            Plan:  1. Pneumonia: CXR not impressive but presentation supports dx. Started on Vanc and Zosyn in ED. Will continue. PRN nebs Cultures pending. UA pending as well. Will get speech eval. Suspect aspiration. NPO for now, then per their recomendations.   2.Parkinsons Disease with advanced dementia: Need to hold meds for now given the dysphagia and suspected aspiration.  3. Dysphagia: Speech to see. NPO for now. Suspect aspiration  4. Hypoxia: on  Admission. Responded to supplemental o2. Likely due to #1      DVT prophylaxis:low dose lovenox  Code Status: DNR/AND (per documentation sent from [facility])  Admission Status: Patient will be admitted to (LEXOBS or LEXINPT)     PADDY Elise 06/11/17 5:57  AM      PHYSICIAN NOTE    88-year-old male, nursing home resident, presented to the ER secondary to hypoxia and low-grade fever.  Patient has dementia plus Parkinson's disease.  Patient is a poor historian.  History is obtained from the chart.  On ED workup he was thought to have possible aspiration pneumonia-continue Zosyn and vanc started in the ED.  -Follow-up of initial chest x-ray results.  -Swallow eval in the am.  -As per nursing home papers patient is DNR will continue to honor that hopefully we can contact the family in a.m. and discuss the level of care with them.  Rest assessment and plan as per NP's note.    I have independently seen and examined the patient with ARNP and the note above reflects my changes and contributions. I have discussed with findings, diagnosis and plans with the patient and family.     Toni Yuen MD 06/11/17 6:31 AM       Please note that portions of this note may have been completed with a voice recognition program. Efforts were made to edit the dictations, but occasionally words are mistranscribed.

## 2017-06-11 NOTE — PLAN OF CARE
Problem: Patient Care Overview (Adult)  Goal: Plan of Care Review    06/11/17 1746   Coping/Psychosocial Response Interventions   Plan Of Care Reviewed With family   Patient Care Overview   Progress no change       Goal: Adult Individualization and Mutuality  Outcome: Ongoing (interventions implemented as appropriate)  Goal: Discharge Needs Assessment    06/11/17 1746   Discharge Needs Assessment   Discharge Disposition skilled nursing facility         Problem: Pressure Ulcer Risk (Macario Scale) (Adult,Obstetrics,Pediatric)  Goal: Identify Related Risk Factors and Signs and Symptoms  Outcome: Ongoing (interventions implemented as appropriate)    06/11/17 1746   Pressure Ulcer Risk (Macario Scale)   Related Risk Factors (Pressure Ulcer Risk (Macario Scale)) cognitive impairment;mobility impaired;infection;excretions/secretions       Goal: Skin Integrity    06/11/17 1746   Pressure Ulcer Risk (Macario Scale) (Adult,Obstetrics,Pediatric)   Skin Integrity making progress toward outcome

## 2017-06-11 NOTE — THERAPY EVALUATION
Acute Care - Speech Language Pathology   Swallow Initial Evaluation [facility]   Clinical Swallow Evaluation       Patient Name: Victor Hugo Dumont  : 1928  MRN: [id number]  Today's Date: 2017               Admit Date: 2017    Visit Dx:     ICD-10-CM ICD-9-CM   1. Hypoxia R09.02 799.02   2. Pneumonia of right lower lobe due to infectious organism J18.9 483.8     Patient Active Problem List   Diagnosis   • Pneumonia   • Parkinson disease   • Dysphagia   • Hypoxia   • Acute respiratory failure with hypoxia     Past Medical History:   Diagnosis Date   • Abnormal posture    • Alzheimer disease    • Benign prostatic hypertrophy    • Bronchitis    • Cataracts, bilateral    • Cognitive communication deficit    • Depression    • Dysphagia    • Generalized anxiety disorder    • GERD (gastroesophageal reflux disease)    • Hyperlipidemia    • Malignant neoplasm of skin    • Muscle weakness    • Orthostatic hypotension    • Parkinson disease    • Peripheral neuropathy    • Personal history of transient ischemic attack (TIA), and cerebral infarction without residual deficits    • Pneumonia    • Risk for falls    • Suicidal ideation    • Suicide attempt    • TIA (transient ischemic attack)    • UTI (urinary tract infection)    • Voice disorder      Past Surgical History:   Procedure Laterality Date   • ADENOIDECTOMY     • OTHER SURGICAL HISTORY      LASER REPAIR OF RETINAL TEAR   • TONSILLECTOMY            SWALLOW EVALUATION (last 72 hours)      Swallow Evaluation       17 0900                Rehab Evaluation    Document Type evaluation  -LS        Subjective Information no complaints  -LS        General Information    Patient Profile Review yes  -LS        Precautions/Limitations, Vision other (see comments);WFL   functional for eval  -LS        Precautions/Limitations, Hearing other (see comments);WFL   for eval  -LS        Prior Level of Function- Communication other (comment)   dementia at baseline   -LS        Prior Level of Function- Swallowing no diet consistency restrictions  -        Plans/Goals Discussed With patient  -        Barriers to Rehab medically complex  -        Clinical Impression    Patient's Goals For Discharge patient did not state  -        SLP Swallowing Diagnosis other (see comments)   r/o pharyngeal dysphagia  -        Rehab Potential/Prognosis, Swallowing fair, will monitor progress closely  -        Criteria for Skilled Therapeutic Interventions Met skilled criteria for dysphagia intervention met  -        FCM, Swallowing 1-->Level 1  -        SLP Diet Recommendation NPO: unsafe for food/liquid intake  -        Recommended Diagnostics reassess via clinical swallow (non-instrumental exam)  -        SLP Rec. for Method of Medication Administration meds via alternate route  -        Oral Motor Structure and Function    Dentition Assessment present and adequate  -        Secretion Management WNL/WFL  -        Mucosal Quality moist, healthy  -        Volitional Swallow mild to moderate difficulties initiating volitional swallow  -LS        Volitional Cough weak volitional cough  -        Clinical Swallow Exam    Oral Phase Results impaired oral phase, signs of dysfunction present   increased prep time w/ ice chips  -LS        Pharyngeal Phase Results sign/symptoms of pharyngeal impairment;cough  -        Summary of Clinical Exam R/o pharyngeal dysphagia. Pt opened eyes to stimulation. Increased prep time with ice chip. Cough w/ ice chips x2. Wet breath sounds at baseline. Pt is at high risk for asp.REC: NPO, meds via alt route, re-assess 6/12  -          User Key  (r) = Recorded By, (t) = Taken By, (c) = Cosigned By    Initials Name Effective Dates     Merle Stephens, MS [facility]-SLP 06/22/15 -         EDUCATION  The patient has been educated in the following areas:   Dysphagia (Swallowing Impairment) Oral Care/Hydration NPO rationale.    SLP Recommendation  and Plan  SLP Swallowing Diagnosis: other (see comments) (r/o pharyngeal dysphagia)  SLP Diet Recommendation: NPO: unsafe for food/liquid intake     SLP Rec. for Method of Medication Administration: meds via alternate route     Recommended Diagnostics: reassess via clinical swallow (non-instrumental exam)  Criteria for Skilled Therapeutic Interventions Met: skilled criteria for dysphagia intervention met     Rehab Potential/Prognosis, Swallowing: fair, will monitor progress closely                Plan of Care Review  Plan Of Care Reviewed With: patient  Outcome Summary/Follow up Plan: Clinical swallow eval compelte. R/o pharyngeal dysphagia. Pt opened eyes to stimulation. Increased prep time with ice chip. Cough w/ ice chips x2. Wet breath sounds at baseline. Pt is at high risk for asp.REC: NPO, meds via alt route, re-assess 6/12             Time Calculation:         Time Calculation- SLP       06/11/17 1011          Time Calculation- SLP    SLP Start Time 0900  -      SLP Received On 06/11/17  -        User Key  (r) = Recorded By, (t) = Taken By, (c) = Cosigned By    Initials Name Provider Type     Merle Stephens MS CCC-SLP Speech and Language Pathologist          Therapy Charges for Today     Code Description Service Date Service Provider Modifiers Qty    [id number] HC ST EVAL ORAL PHARYNG SWALLOW 2 6/11/2017 Merle Stephens MS CCC-SLP GN 1               Merle Stephens MS CCC-SLP  6/11/2017

## 2017-06-11 NOTE — PLAN OF CARE
Problem: Patient Care Overview (Adult)  Goal: Plan of Care Review  Outcome: Ongoing (interventions implemented as appropriate)    06/11/17 1011   Coping/Psychosocial Response Interventions   Plan Of Care Reviewed With patient   Outcome Evaluation   Outcome Summary/Follow up Plan Clinical swallow eval compete. R/o pharyngeal dysphagia. Pt opened eyes to stimulation. Increased prep time with ice chip. Cough w/ ice chips x2. Wet breath sounds at baseline. Pt is at high risk for asp.REC: NPO, meds via alt route, re-assess 6/12

## 2017-06-11 NOTE — PROGRESS NOTES
[facility] Medicine Services    LOS- Day 0  Patient seen, chart reviewed, labs reviewed.  Continue current care.   Obrtunded, Called   Too Zelaya Son [phone]     Left message      Lo Dumont Spouse [phone]     Busy signal   DNR, continue abx/oxygen/supprtive care, follow cultures,     Abhijeet Rahman MD06/11/1711:08 AM      Addendum, discussed with family members at meeting and goals clarified

## 2017-06-12 NOTE — CONSULTS
No Known Provider  Consulting physician: Itz;    Chief Complaint   Patient presents with   • Fever         HPI: Patient is an 88-year-old male who is currently minimally responsive.  Patient came in hospital from [facility] with noted respiratory distress as well as hypoxia and fever.  The patient has been found have pneumonia.  Apparently patient has a swallowing eval's and has apparently fell down.  Wife states that he is been overall fairly comfortable today.        Past Medical History:   Diagnosis Date   • Abnormal posture    • Alzheimer disease    • Benign prostatic hypertrophy    • Bronchitis    • Cataracts, bilateral    • Cognitive communication deficit    • Depression    • Dysphagia    • Generalized anxiety disorder    • GERD (gastroesophageal reflux disease)    • Hyperlipidemia    • Malignant neoplasm of skin    • Muscle weakness    • Orthostatic hypotension    • Parkinson disease    • Peripheral neuropathy    • Personal history of transient ischemic attack (TIA), and cerebral infarction without residual deficits    • Pneumonia    • Risk for falls    • Suicidal ideation    • Suicide attempt    • TIA (transient ischemic attack)    • UTI (urinary tract infection)    • Voice disorder      Past Surgical History:   Procedure Laterality Date   • ADENOIDECTOMY     • OTHER SURGICAL HISTORY      LASER REPAIR OF RETINAL TEAR   • TONSILLECTOMY       Current Facility-Administered Medications   Medication Dose Route Frequency Provider Last Rate Last Dose   • ! Vancomycin Trough before 18:00 dose on Monday 6/12; Hold dose if level > 20.   Does not apply Once Sandip Lebron [facility]       • acetaminophen (TYLENOL) suppository 650 mg  650 mg Rectal Q6H PRN Toni Yuen MD   650 mg at 06/11/17 1349   • acetaminophen (TYLENOL) tablet 650 mg  650 mg Oral Q6H PRN Toni Yuen MD       • aspirin chewable tablet 81 mg  81 mg Oral Daily Toni Yuen MD   Stopped at 06/11/17 0833   • carbidopa-levodopa (SINEMET)   MG per tablet 1 tablet  1 tablet Oral 4x Daily Toni Yuen MD   Stopped at 06/11/17 0856   • citalopram (CeleXA) tablet 20 mg  20 mg Oral Daily Toni Yuen MD   Stopped at 06/11/17 0856   • docusate sodium (COLACE) capsule 100 mg  100 mg Oral BID Toni Yuen MD   Stopped at 06/11/17 1800   • donepezil (ARICEPT) tablet 10 mg  10 mg Oral Nightly Toni Yuen MD   Stopped at 06/11/17 2100   • enoxaparin (LOVENOX) syringe 40 mg  40 mg Subcutaneous Daily Blake Dykes [facility]   40 mg at 06/12/17 0519   • famotidine (PEPCID) tablet 20 mg  20 mg Oral BID Toni Yuen MD   Stopped at 06/11/17 0856   • finasteride (PROSCAR) tablet 5 mg  5 mg Oral Daily Toni Yuen MD   Stopped at 06/11/17 0856   • ipratropium-albuterol (DUO-NEB) nebulizer solution 3 mL  3 mL Nebulization Q6H PRN Toni Yuen MD       • memantine (NAMENDA) tablet 10 mg  10 mg Oral BID Toni Yuen MD   Stopped at 06/11/17 0857   • ondansetron (ZOFRAN) tablet 4 mg  4 mg Oral Q6H PRN Toni Yuen MD        Or   • ondansetron (ZOFRAN) injection 4 mg  4 mg Intravenous Q6H PRN Toni Yuen MD       • Pharmacy to dose vancomycin   Does not apply Continuous PRN Toni Yuen MD       • piperacillin-tazobactam (ZOSYN) 4.5 g/100 mL 0.9% NS IVPB (mbp)  4.5 g Intravenous Q6H Toni Yuen MD   4.5 g at 06/12/17 1320   • sodium chloride 0.9 % flush 1-10 mL  1-10 mL Intravenous PRN Toni Yuen MD       • sodium chloride 0.9 % flush 10 mL  10 mL Intravenous PRN TONE Harrell       • sodium chloride 0.9 % infusion  100 mL/hr Intravenous Continuous Abhijeet Rahman  mL/hr at 06/11/17 2300 100 mL/hr at 06/11/17 2300   • terazosin (HYTRIN) capsule 2 mg  2 mg Oral Nightly Toni Yuen MD   Stopped at 06/11/17 2100   • vancomycin 1500 mg/500 mL 0.9% NS IVPB ([facility])  15 mg/kg Intravenous Q12H Sandip Lebron [facility]   1,500 mg at 06/12/17 0559       Pharmacy to dose vancomycin     sodium chloride 100 mL/hr Last Rate: 100 mL/hr  "(06/11/17 2300)     acetaminophen  •  acetaminophen  •  ipratropium-albuterol  •  ondansetron **OR** ondansetron  •  Pharmacy to dose vancomycin  •  sodium chloride  •  Insert peripheral IV **AND** sodium chloride  No Known Allergies  History reviewed. No pertinent family history.  Social History     Social History   • Marital status:      Spouse name: N/A   • Number of children: N/A   • Years of education: N/A     Occupational History   • Not on file.     Social History Main Topics   • Smoking status: Unknown If Ever Smoked   • Smokeless tobacco: Not on file   • Alcohol use No   • Drug use: No   • Sexual activity: Not on file     Other Topics Concern   • Not on file     Social History Narrative   • No narrative on file     Review of Systems - Unable to obtain secondary to patient been unresponsive      /76 (BP Location: Left arm, Patient Position: Lying)  Pulse 66  Temp 99 °F (37.2 °C) (Axillary)   Resp 24  Ht 72\" (182.9 cm)  Wt 218 lb (98.9 kg)  SpO2 94%  BMI 29.57 kg/m2    Intake/Output Summary (Last 24 hours) at 06/12/17 1437  Last data filed at 06/12/17 0300   Gross per 24 hour   Intake          2106.67 ml   Output                0 ml   Net          2106.67 ml     Physical Exam:      General Appearance:    Minimally responsive    Head:    Normocephalic, without obvious abnormality, atraumatic   Eyes:            Lids and lashes normal, conjunctivae and sclerae normal, no   icterus, no pallor, corneas clear, PERRLA   Ears:    Ears appear intact with no abnormalities noted   Throat:   No oral lesions, no thrush, oral mucosa moist   Neck:   No adenopathy, supple, trachea midline, no thyromegaly, no   carotid bruit, no JVD   Back:     No kyphosis present, no scoliosis present, no skin lesions,      erythema or scars, no tenderness to percussion or                   palpation,   range of motion normal   Lungs:     Coarse breath sounds in lower lobes bilaterally     Heart:    Regular rhythm and " normal rate, normal S1 and S2, no            murmur, no gallop, no rub, no click   Chest Wall:    No abnormalities observed   Abdomen:     Normal bowel sounds, no masses, no organomegaly, soft        non-tender, non-distended, no guarding, no rebound                tenderness   Rectal:     Deferred   Extremities:   no edema, no cyanosis, no             redness   Pulses:   Pulses palpable and equal bilaterally   Skin:   No bleeding, bruising or rash   Lymph nodes:   No palpable adenopathy             Results from last 7 days  Lab Units 06/12/17  0507   WBC 10*3/mm3 9.89   HEMOGLOBIN g/dL 12.5*   HEMATOCRIT % 38.9   PLATELETS 10*3/mm3 139*       Results from last 7 days  Lab Units 06/12/17  0507 06/11/17  0802   SODIUM mmol/L 138 141   POTASSIUM mmol/L 3.6 3.6   CHLORIDE mmol/L 107 108   TOTAL CO2 mmol/L 28.0 31.0   BUN mg/dL 9 12   CREATININE mg/dL 0.70 0.80   CALCIUM mg/dL 8.7 9.1   BILIRUBIN mg/dL  --  1.0   ALK PHOS U/L  --  67   ALT (SGPT) U/L  --  11   AST (SGOT) U/L  --  16   GLUCOSE mg/dL 87 88       Results from last 7 days  Lab Units 06/12/17  0507   SODIUM mmol/L 138   POTASSIUM mmol/L 3.6   CHLORIDE mmol/L 107   TOTAL CO2 mmol/L 28.0   BUN mg/dL 9   CREATININE mg/dL 0.70   GLUCOSE mg/dL 87   CALCIUM mg/dL 8.7     Imaging Results (last 72 hours)     Procedure Component Value Units Date/Time    XR Chest 1 View [[id number]]  (Abnormal) Collected:  06/11/17 0244     Updated:  06/11/17 0445    Narrative:         EXAM:  XR Chest, 1 View    CLINICAL HISTORY:  88 years old, male; Signs and symptoms; Dyspnea and fever; Additional info:   Fever, rll rales    TECHNIQUE:  Frontal view of the chest.    COMPARISON:  No relevant prior studies available.    FINDINGS:  Lungs:  Minimal bilateral mid and lower lung zone linear and groundglass   opacities, left greater than right, with areas of atelectasis/scarring and   minimal pneumonitis.  Pleural space:  Normal.  No pneumothorax.  Heart:  Normal.  No  cardiomegaly.  Mediastinum:  Normal.  Bones/joints:  Multilevel thoracic spine degenerative changes.  Degenerative   changes of the glenohumeral and acromioclavicular joints.  Lymph nodes:  Calcified hilar lymph nodes, compatible with prior granulomatous   disease.      Impression:         1.  Minimal bilateral mid and lower lung zone linear and groundglass opacities,   left greater than right, with areas of atelectasis/scarring and minimal   pneumonitis.  No focal consolidation.    2.  Incidental/non-acute findings are described above.    THIS DOCUMENT HAS BEEN ELECTRONICALLY SIGNED BY ANGEL DAVIDSON MD    CT Head Without Contrast [[id number]] Collected:  06/11/17 1316     Updated:  06/11/17 1406    Narrative:       EXAMINATION: CT HEAD WO CONTRAST - 06/11/2017     INDICATION: R09.02-Hypoxemia; J18.9-Pneumonia, unspecified organism.  Weakness.     TECHNIQUE: Multiple axial CT imaging is obtained of the head from skull  base to skull vertex without the administration of intravenous contrast.     The radiation dose reduction device was turned on for each scan per the  ALARA (As Low as Reasonably Achievable) protocol.     COMPARISON: None.     FINDINGS: There is atrophy identified of the brain. Low density is seen  within the periventricular and subcortical white matter suggesting  chronic small vessel ischemic change. There is no hemorrhage or  hydrocephalus. No mass, mass effect, or midline shift. No abnormal  extra-axial fluid collection is identified. The bony structures reveal  mucosal thickening involving the right maxillary sinus. The mastoid air  cells are patent. Mucosal thickening as well is seen within the ethmoid  air cells.       Impression:       Chronic paranasal sinusitis with extensive atrophy and  chronic changes in the brain. No acute intracranial abnormality is  identified.     DICTATED:     06/[id number]  EDITED:         06/11/2017     This report was finalized on 6/11/2017 2:04 PM by Dr. Garay  MD Juvencio.           Impression: Dyspnea  Pneumonia  Parkinson's  Dysphagia  Goals of care  Plan: At this point time I did have a brief discussion with the patient's wife is at bedside.  She states that the overall plan is to have another swallowing study later on today.  Plan is to also continue antibiotics for the next 24 hours.  She does understand that if the patient does not improve that there is little chance that he will improve beyond this time frame.  With this in mind she did briefly asked about hospice.  I will plan on talking more to her about hospice tomorrow if appropriate.        Dionisio Edward,   06/12/17  2:37 PM

## 2017-06-12 NOTE — THERAPY EVALUATION
Acute Care - Speech Language Pathology   Swallow Re-Evaluation [facility]   Clinical Swallow Evaluation     Patient Name: Victor Hugo Dumont  : 1928  MRN: [id number]  Today's Date: 2017               Admit Date: 2017    Visit Dx:     ICD-10-CM ICD-9-CM   1. Hypoxia R09.02 799.02   2. Pneumonia of right lower lobe due to infectious organism J18.9 483.8   3. Dysphagia, unspecified R13.10 787.20     Patient Active Problem List   Diagnosis   • Pneumonia   • Parkinson disease   • Dysphagia   • Hypoxia   • Acute respiratory failure with hypoxia     Past Medical History:   Diagnosis Date   • Abnormal posture    • Alzheimer disease    • Benign prostatic hypertrophy    • Bronchitis    • Cataracts, bilateral    • Cognitive communication deficit    • Depression    • Dysphagia    • Generalized anxiety disorder    • GERD (gastroesophageal reflux disease)    • Hyperlipidemia    • Malignant neoplasm of skin    • Muscle weakness    • Orthostatic hypotension    • Parkinson disease    • Peripheral neuropathy    • Personal history of transient ischemic attack (TIA), and cerebral infarction without residual deficits    • Pneumonia    • Risk for falls    • Suicidal ideation    • Suicide attempt    • TIA (transient ischemic attack)    • UTI (urinary tract infection)    • Voice disorder      Past Surgical History:   Procedure Laterality Date   • ADENOIDECTOMY     • OTHER SURGICAL HISTORY      LASER REPAIR OF RETINAL TEAR   • TONSILLECTOMY            SWALLOW EVALUATION (last 72 hours)      Swallow Evaluation       17 1440 17 0900             Rehab Evaluation    Document Type evaluation  -RD evaluation  -LS       Subjective Information no complaints  -RD no complaints  -LS       Patient Effort, Rehab Treatment fair  -RD        General Information    Patient Profile Review yes  -RD yes  -LS       Onset of Illness/Injury 17  -RD        Subjective Patient Observations Intermittently alert, lethargic  -RD         Pertinent History Of Current Problem Adm w/ hypoxia, PNA, respiratory insufficiency. Hx of Parkinson's. CXR: min bilateral mid & lower lung zpne linear & groundglass opacities. L>R w/ atelectasis/scarring & min pneumonitis; no focal consolidation. Suspect aspiration per MD note.  -RD        Current Diet Limitations NPO  -RD        Precautions/Limitations, Vision WFL;other (see comments)   for purposes of eval  -RD other (see comments);WFL   functional for eval  -LS       Precautions/Limitations, Hearing WFL;other (see comments)   for purposes of eval  -RD other (see comments);WFL   for eval  -LS       Prior Level of Function- Communication other (comment)   baseline dementia, dependent w/ ADLs  -RD other (comment)   dementia at baseline  -LS       Prior Level of Function- Swallowing diet modifications- foods;other (comment)   per pt's wife  -RD no diet consistency restrictions  -LS       Plans/Goals Discussed With patient and family;agreed upon  -RD patient  -LS       Barriers to Rehab medically complex  -RD medically complex  -LS       Clinical Impression    Patient's Goals For Discharge patient did not state  -RD patient did not state  -LS       Family Goals For Discharge patient able to return to PO diet  -RD        SLP Swallowing Diagnosis other (see comments)   r/o pharyngeal dysphagia  -RD other (see comments)   r/o pharyngeal dysphagia  -LS       Rehab Potential/Prognosis, Swallowing fair, will monitor progress closely  -RD fair, will monitor progress closely  -LS       Criteria for Skilled Therapeutic Interventions Met skilled criteria for dysphagia intervention met  -RD skilled criteria for dysphagia intervention met  -LS       FCM, Swallowing 1-->Level 1  -RD 1-->Level 1  -LS       Therapy Frequency evaluation only  -RD        SLP Diet Recommendation NPO: unsafe for food/liquid intake  -RD NPO: unsafe for food/liquid intake  -LS       Recommended Diagnostics reassess via clinical swallow  (non-instrumental exam)  -RD reassess via clinical swallow (non-instrumental exam)  -LS       SLP Rec. for Method of Medication Administration meds via alternate route  -RD meds via alternate route  -LS       Pain Assessment    Pain Assessment No/denies pain  -RD        Oral Motor Structure and Function    Oral Motor Anatomy and Physiology patient demonstrates anatomy that is WNL  -RD        Dentition Assessment present and adequate  -RD present and adequate  -LS       Secretion Management gagging or choking on secretions  -RD WNL/WFL  -LS       Mucosal Quality moist, healthy  -RD moist, healthy  -LS       Volitional Swallow mild to moderate difficulties initiating volitional swallow  -RD mild to moderate difficulties initiating volitional swallow  -LS       Volitional Cough weak volitional cough  -RD weak volitional cough  -LS       Oral Musculature General Assessment other (see comments)   generalized oral weakness  -RD        General Feeding/Swallowing Observations    Current Feeding Method NPO  -RD        Respiratory Support Currently in Use nasal cannula in use  -RD        Flow (L/min) 2  -RD        Clinical Swallow Exam    Mode of Presentation fed by clinician;spoon  -RD        Oral Preparation Concerns thin:;pudding:;increased prep time  -RD        Respiratory Concerns decreased control/incoordination of breathing with swallow  -RD        Oral Phase Results impaired oral phase, signs of dysfunction present  -RD impaired oral phase, signs of dysfunction present   increased prep time w/ ice chips  -LS       Pharyngeal Phase Results sign/symptoms of pharyngeal impairment;cough;susupected impaired pharyngeal phase of swallowing  -RD sign/symptoms of pharyngeal impairment;cough  -LS       Summary of Clinical Exam Repeat clinical dysphagia eval complete. Pt opened eyes to participate, but lethargic. Generalized oral weakness. Trials of thins via ice and 1/2 tsp and 1/2 tsp of puree. Overt s/s of aspiration w/ all  trials c/b coughing and wet vocal quality. Pt w/ wet-gurgled vocal quality at baseline. Pt noted w/ O2 desat to 79% after PO trials, returned to 86% O2 prior to leaving pt's room. Pt at high risk for aspiration, not ready for instrumental eval. RECS: NPO, meds alt route, BS re-eval as appropriate  -RD R/o pharyngeal dysphagia. Pt opened eyes to stimulation. Increased prep time with ice chip. Cough w/ ice chips x2. Wet breath sounds at baseline. Pt is at high risk for asp.REC: NPO, meds via alt route, re-assess 6/12  -LS       Swallow Recommendations    Oral Care oral care with toothbrush and dentifrice BID and PRN  -RD        Other Recommendations repeat clincial exam  -RD        Recommended Diet NPO: unsafe for food/liquid intake  -RD          User Key  (r) = Recorded By, (t) = Taken By, (c) = Cosigned By    Initials Name Effective Dates    LS Merle Stephens, MS CCC-SLP 06/22/15 -     RD Landy Lemon, MS CF-SLP 09/18/16 -         EDUCATION  The patient has been educated in the following areas:   Dysphagia (Swallowing Impairment) Oral Care/Hydration NPO rationale.    SLP Recommendation and Plan  SLP Swallowing Diagnosis: other (see comments) (r/o pharyngeal dysphagia)  SLP Diet Recommendation: NPO: unsafe for food/liquid intake     SLP Rec. for Method of Medication Administration: meds via alternate route     Recommended Diagnostics: reassess via clinical swallow (non-instrumental exam)  Criteria for Skilled Therapeutic Interventions Met: skilled criteria for dysphagia intervention met     Rehab Potential/Prognosis, Swallowing: fair, will monitor progress closely  Therapy Frequency: evaluation only             Plan of Care Review  Plan Of Care Reviewed With: patient, family  Progress: no change (BS re-eval)  Outcome Summary/Follow up Plan: Repeat clinical dysphagia eval complete. Pt opened eyes to participate, but lethargic. Generalized oral weakness. Trials of thins via ice and 1/2 tsp and 1/2 tsp of puree.  Overt s/s of aspiration w/ all trials c/b coughing and wet vocal quality. Pt w/ wet-gurgled vocal quality at baseline. Pt noted w/ O2 desat to 79% after PO trials, returned to 86% O2 prior to leaving pt's room. Pt at high risk for aspiration, not ready for instrumental eval. RECS: NPO, meds alt route, BS re-eval as appropriate           SLP Outcome Measures (last 72 hours)      SLP Outcome Measures       06/12/17 1500          SLP Outcome Measures    Outcome Measure Used? Adult NOMS  -      FCM Scores    FCM Chosen Swallowing  -      Swallowing FCM Score 1  -        User Key  (r) = Recorded By, (t) = Taken By, (c) = Cosigned By    Initials Name Effective Dates    MS ADORE Monk-SLP 09/18/16 -            Time Calculation:         Time Calculation- SLP       06/12/17 1551          Time Calculation- SLP    SLP Start Time 1440  -      SLP Received On 06/12/17  -        User Key  (r) = Recorded By, (t) = Taken By, (c) = Cosigned By    Initials Name Provider Type    NICOLE Lemon MS CF-SLP Speech and Language Pathologist          Therapy Charges for Today     Code Description Service Date Service Provider Modifiers Qty    [id number] HC ST EVAL ORAL PHARYNG SWALLOW 6 6/12/2017 MS ADORE Clements-SLP GN 1               MS DON Clements  6/12/2017

## 2017-06-12 NOTE — PROGRESS NOTES
[facility] Medicine Services    ASSESSMENT / PLAN          1. Acute hypoxic respiratory failure, sepsis present on admission , Pneumonia: Healthcare associated probably aspiration , continue Vanc and Zosyn in ED. Will continue. PRN nebs Cultures pending. UA pending as well.  Failed swallow evaluation had a long discussion with the wife and the family other family members yesterday and this morning as well if no further improvement over next 24-48 hours we will consider full comfort measures no artificial feeding will get palliative care consultation on board,  NPO for now, then per their recomendations.   2.Parkinsons Disease with advanced dementia: Need to hold meds for now given the dysphagia and suspected aspiration.  3. Dysphagia:   NPO for now. 4. Hypoxia: on Admission. Responded to supplemental o2. Likely due to #1        DVT prophylaxis:low dose lovenox  Code Status: DNR      Condition--serious  Prognosis-- guarded  Expected Discharge disposition in        to be decided    SUBJECTIVE--HPI/ Events overnight / CC- Hospital Follow up visit/ ROS-not detailed ,  as performed below    Looking more lethargic today according to the wife at bedside she does not want to consider any artificial feeding (eyes on and off patient not able to give me any review of systems   almost obtunded failed swallow evaluation yesterday      OBJECTIVE        Vital Sign Min/Max for last 24 hours  Temp  Min: 97.8 °F (36.6 °C)  Max: 99.5 °F (37.5 °C)   BP  Min: 95/66  Max: 159/78   Pulse  Min: 59  Max: 69   Resp  Min: 18  Max: 24   SpO2  Min: 93 %  Max: 94 %   Flow (L/min)  Min: 2  Max: 2      Intake/Output Summary (Last 24 hours) at 06/12/17 1330  Last data filed at 06/12/17 0300   Gross per 24 hour   Intake          2106.67 ml   Output                0 ml   Net          2106.67 ml           Gen/Lying in bed obtunded only open eyes to painful stimuli not following commands with EpiPen mild distress  CV-RR  tachycardia, S1 S2 normal, no m/r/g  Resp-bilateral diffuse coarse upper respiratory sounds, no wheezes  Abd-soft, NT, ND, +BS  Ext-no edema  Neuro-lethargic oriented ×0,   Not able to assess  mood  Skin, no new rashes over last 24 hours    Medications    Pharmacy Consult  Does not apply Once   aspirin 81 mg Oral Daily   carbidopa-levodopa 1 tablet Oral 4x Daily   citalopram 20 mg Oral Daily   docusate sodium 100 mg Oral BID   donepezil 10 mg Oral Nightly   enoxaparin 40 mg Subcutaneous Daily   famotidine 20 mg Oral BID   finasteride 5 mg Oral Daily   memantine 10 mg Oral BID   piperacillin-tazobactam 4.5 g Intravenous Q6H   terazosin 2 mg Oral Nightly   vancomycin 15 mg/kg Intravenous Q12H     Meds Prn  acetaminophen  •  acetaminophen  •  ipratropium-albuterol  •  ondansetron **OR** ondansetron  •  Pharmacy to dose vancomycin  •  sodium chloride  •  Insert peripheral IV **AND** sodium chloride    I have reviewed the labs, culture data, radiology results, and diagnostic studies.    Results from last 7 days  Lab Units 06/12/17  0507 06/11/17  0802 06/11/17  0256   SODIUM mmol/L 138 141 138   POTASSIUM mmol/L 3.6 3.6 3.7   CHLORIDE mmol/L 107 108 104   TOTAL CO2 mmol/L 28.0 31.0 28.0   BUN mg/dL 9 12 15   CREATININE mg/dL 0.70 0.80 0.80   CALCIUM mg/dL 8.7 9.1 9.0   BILIRUBIN mg/dL  --  1.0 0.8   ALK PHOS U/L  --  67 59   ALT (SGPT) U/L  --  11 2*   AST (SGOT) U/L  --  16 11   GLUCOSE mg/dL 87 88 129*       Results from last 7 days  Lab Units 06/12/17  0507 06/11/17  0802 06/11/17  0256   WBC 10*3/mm3 9.89 10.91* 11.70*   HEMOGLOBIN g/dL 12.5* 13.6 13.4   HEMATOCRIT % 38.9 42.1 40.3   PLATELETS 10*3/mm3 139* 153 178           Culture Data:    Radiology Results:  Imaging Results (last 24 hours)     Procedure Component Value Units Date/Time    CT Head Without Contrast [[id number]] Collected:  06/11/17 1316     Updated:  06/11/17 1406    Narrative:       EXAMINATION: CT HEAD WO CONTRAST - 06/11/2017     INDICATION:  R09.02-Hypoxemia; J18.9-Pneumonia, unspecified organism.  Weakness.     TECHNIQUE: Multiple axial CT imaging is obtained of the head from skull  base to skull vertex without the administration of intravenous contrast.     The radiation dose reduction device was turned on for each scan per the  ALARA (As Low as Reasonably Achievable) protocol.     COMPARISON: None.     FINDINGS: There is atrophy identified of the brain. Low density is seen  within the periventricular and subcortical white matter suggesting  chronic small vessel ischemic change. There is no hemorrhage or  hydrocephalus. No mass, mass effect, or midline shift. No abnormal  extra-axial fluid collection is identified. The bony structures reveal  mucosal thickening involving the right maxillary sinus. The mastoid air  cells are patent. Mucosal thickening as well is seen within the ethmoid  air cells.       Impression:       Chronic paranasal sinusitis with extensive atrophy and  chronic changes in the brain. No acute intracranial abnormality is  identified.     DICTATED:     [id number]  EDITED:         06/11/2017     This report was finalized on 6/11/2017 2:04 PM by Dr. Tanisha Henning MD.           *. Please note that portions of this note were completed with a voice recognition program. Efforts were made to edit the dictations, but occasionally words are mistranscribed.  Abhijeet Rahman MD06/12/171:30 PM

## 2017-06-12 NOTE — PROGRESS NOTES
Discharge Planning Assessment  [facility]     Patient Name: Victor Hugo Dumont  MRN: [id number]  Today's Date: 6/12/2017    Admit Date: 6/11/2017          Discharge Needs Assessment       06/12/17 1045    Living Environment    Lives With facility resident    Living Arrangements extended care facility    Home Accessibility no concerns    Stair Railings at Home none    Type of Financial/Environmental Concern none    Transportation Available ambulance    Living Environment    Provides Primary Care For no one, unable/limited ability to care for self    Primary Care Provided By spouse/significant other;child(raquel) (specify);other (see comments)    Unique Family Situation Pt is a resident of [facility]    Quality Of Family Relationships supportive;helpful;involved    Able to Return to Prior Living Arrangements yes    Discharge Needs Assessment    Concerns To Be Addressed discharge planning concerns    Readmission Within The Last 30 Days no previous admission in last 30 days    Outpatient/Agency/Support Group Needs long term acute care facility (specify)    Community Agency Name(S) [facility]    Equipment Currently Used at Home hospital bed;walker, rolling    Equipment Needed After Discharge none    Discharge Facility/Level Of Care Needs other (see comments)            Discharge Plan       06/12/17 1049    Case Management/Social Work Plan    Plan Return to [facility]    Patient/Family In Agreement With Plan yes    Additional Comments Pt is a resident of the [facility], [phone],  for the past two years. His wife reports he needs assistance with all ADLs. He is followed by his PCP and his medications are covered by insurance. Per wife [facility] CM to follow.        Discharge Placement     No information found        Expected Discharge Date and Time     Expected Discharge Date Expected Discharge Time    Jun 14, 2017               Demographic Summary        06/12/17 1044    Referral Information    Admission Type inpatient    Referral Source physician    Reason For Consult discharge planning    Record Reviewed history and physical;medical record    Contact Information    Permission Granted to Share Information With ;facility ;family/designee    Primary Care Physician Information    Name M[facility]            Functional Status       06/12/17 1045    Functional Status Current    Current Functional Level Comment See PT eval    Functional Status Prior    Ambulation 3-->assistive equipment and person    Transferring 3-->assistive equipment and person    Toileting 3-->assistive equipment and person    Bathing 3-->assistive equipment and person    Dressing 3-->assistive equipment and person    Eating 3-->assistive equipment and person    Communication 2-->difficulty understanding (not related to language barrier)    Swallowing 0-->swallows foods/liquids without difficulty    IADL    Medications assistive person    Meal Preparation assistive person    Housekeeping assistive person    Laundry assistive person    Shopping assistive person    Oral Care assistive person            Psychosocial     None            Abuse/Neglect     None            Legal     None            Substance Abuse     None            Patient Forms     None          Micki Gage RN

## 2017-06-12 NOTE — PLAN OF CARE
Problem: Patient Care Overview (Adult)  Goal: Plan of Care Review  Outcome: Ongoing (interventions implemented as appropriate)    06/12/17 7113   Coping/Psychosocial Response Interventions   Plan Of Care Reviewed With patient;family   Outcome Evaluation   Outcome Summary/Follow up Plan Repeat clinical dysphagia eval complete. Pt opened eyes to participate, but lethargic. Generalized oral weakness. Trials of thins via ice and 1/2 tsp and 1/2 tsp of puree. Overt s/s of aspiration w/ all trials c/b coughing and wet vocal quality. Pt w/ wet-gurgled vocal quality at baseline. Pt noted w/ O2 desat to 79% after PO trials, returned to 86% O2 prior to leaving pt's room. Pt at high risk for aspiration, not ready for instrumental eval. RECS: NPO, meds alt route, BS re-eval as appropriate   Patient Care Overview   Progress no change  (BS re-eval)

## 2017-06-12 NOTE — PLAN OF CARE
Problem: Fall Risk (Adult)  Goal: Absence of Falls  Outcome: Ongoing (interventions implemented as appropriate)    06/11/17 2126   Fall Risk (Adult)   Absence of Falls making progress toward outcome

## 2017-06-13 NOTE — PROGRESS NOTES
Adult Nutrition  Assessment/PES    Patient Name:  Victor Hugo Dumont  YOB: 1928  MRN: [id number]  Admit Date:  6/11/2017       Pt NPO 3 days, failed dysphagia eval, consider comfort diet vs enteral feed depending on GOC        Assessment Date:  6/13/2017        Reason for Assessment       06/13/17 1248    Reason for Assessment    Reason For Assessment/Visit NPO/Clr Policy    Time Spent (min) 45    Diagnosis Diagnosis    Cardiac Dyslipidemia    Gastrointestinal GERD/Reflux    Neurological Dysphagia;TIA;Parkinson's;Dementia;Alzheimer's    Psychosocial Depression    Pulmonary/Critical Care Pneumonia        Patient Active Problem List   Diagnosis   • Pneumonia   • Parkinson disease   • Dysphagia   • Hypoxia   • Acute respiratory failure with hypoxia             Anthropometrics       06/13/17 1250    Anthropometrics (Special Considerations)    Height Used for Calculations 1.829 m (6')    Weight Used for Calculations 97.1 kg (214 lb)    RD Calculated BMI (kg/m2) 29            Labs/Tests/Procedures/Meds       06/13/17 1250    Labs/Tests/Procedures/Meds    Labs/Tests Review Reviewed    Procedure Review SLP    Swallow eval status Done   SLP REC: NPO          Results from last 7 days  Lab Units 06/12/17  0507 06/11/17  0802   SODIUM mmol/L 138 141   POTASSIUM mmol/L 3.6 3.6   CHLORIDE mmol/L 107 108   TOTAL CO2 mmol/L 28.0 31.0   BUN mg/dL 9 12   CREATININE mg/dL 0.70 0.80   CALCIUM mg/dL 8.7 9.1   BILIRUBIN mg/dL  --  1.0   ALK PHOS U/L  --  67   ALT (SGPT) U/L  --  11   AST (SGOT) U/L  --  16   GLUCOSE mg/dL 87 88           Physical Findings       06/13/17 1250    Physical Findings/Assessment    Additional Documentation --   pt resting in bed, not alert, wife at bedside            Estimated/Assessed Needs       06/13/17 1251    Estimated/Assessed Energy Needs    Energy Need Method Kcal/kg;[facility]    kcal/kg --   25kcal per kg= 2432kcal    Activity Factors ([facility])  --   MSJ= 1681kcal     Estimated Kcal Range  1681-2432kcal    Estimated/Assessed Protein Needs    Estimated Protein Range 97-117g protein   1-1.2g protein per kg            Nutrition Prescription Ordered       06/13/17 1252    Nutrition Prescription PO    Current PO Diet NPO                Problem/Interventions:        Problem 1       06/13/17 1248    Nutrition Diagnoses Problem 1    Problem 1 Inadequate Intake/Infusion    Inadequate Intake Type --   per clinical status    Signs/Symptoms (evidenced by) NPO                    Intervention Goal       06/13/17 1252    Intervention Goal    General Nutrition support treatment   Palliative Care following for GOC            Nutrition Intervention       06/13/17 1252    Nutrition Intervention    RD/Tech Action Follow Tx progress;Care plan reviewd              Education/Evaluation       06/13/17 1252    Monitor/Evaluation    Monitor Per protocol;I&O;PO intake;Pertinent labs;Weight;Skin status;Symptoms, Plan for Family conference Wednesday 6-14-17          Electronically signed by:  Jo Ann Saxena RD  06/13/17 12:53 PM

## 2017-06-13 NOTE — PLAN OF CARE
Problem: Fall Risk (Adult)  Goal: Absence of Falls  Outcome: Ongoing (interventions implemented as appropriate)    06/12/17 2902   Fall Risk (Adult)   Absence of Falls making progress toward outcome

## 2017-06-13 NOTE — PLAN OF CARE
Problem: Patient Care Overview (Adult)  Goal: Plan of Care Review  Outcome: Ongoing (interventions implemented as appropriate)    06/13/17 1316   Coping/Psychosocial Response Interventions   Plan Of Care Reviewed With patient;spouse   Outcome Evaluation   Outcome Summary/Follow up Plan Pt arousable and maintained alertness during eval today. RN reports pt verbally responding more today. Generalized oral weakness. Wet-gurgled vocal quality at baseline. Trials of thins via ice, 1/2 tsp of thins, and 1/2 tsp of puree. Overt s/s of aspiration c/b weak coughing and incr'd wet breathing w/ all trials. Pt able to maintain O2 sats at 95% during and after PO trials. Pt still w/ severe s/s and at high risk for aspiration, not yet ready for instrumental eval. ST will f/u in dyspahgia tx for instrumental readiness. RECS: NPO, meds alt route, F/u for instrumental readiness in dysphagia tx PRN.    Patient Care Overview   Progress no change         Problem: Inpatient SLP  Goal: Dysphagia- Patient will improve swallowing skills to the level that a repeat evaluation is indicated  Outcome: Ongoing (interventions implemented as appropriate)    06/13/17 1316   Repeat Evaluation Needed   Swallow Skills to Level that Repeat Evaluation Indicated- SLP, Date Established 06/13/17   Swallow Skills to Level that Repeat Evaluation Indicated- SLP, Time to Achieve by discharge   Swallow Skills to Level that Repeat Evaluation Indicated- SLP, Date Goal Reviewed 06/13/17   Swallow Skills to Level that Repeat Evaluation Indicated- SLP, Outcome goal ongoing

## 2017-06-13 NOTE — SIGNIFICANT NOTE
Palliative Team Meeting Attendance  13:00  LALITA Walters RN, NAN Haas, PADDY Florez, RN, DO [facility], [facility], [facility]   MIKHAIL Juarez, PADDY Larios, RN, PN

## 2017-06-13 NOTE — THERAPY EVALUATION
Acute Care - Speech Language Pathology   Swallow Re-Evaluation [facility]   Clinical Swallow Evaluation     Patient Name: Victor Hugo Dumont  : 1928  MRN: [id number]  Today's Date: 2017  Onset of Illness/Injury or Date of Surgery Date: 17            Admit Date: 2017    Visit Dx:     ICD-10-CM ICD-9-CM   1. Hypoxia R09.02 799.02   2. Pneumonia of right lower lobe due to infectious organism J18.9 483.8   3. Dysphagia, unspecified R13.10 787.20   4. Impaired functional mobility, balance, gait, and endurance Z74.09 V49.89     Patient Active Problem List   Diagnosis   • Pneumonia   • Parkinson disease   • Dysphagia   • Hypoxia   • Acute respiratory failure with hypoxia     Past Medical History:   Diagnosis Date   • Abnormal posture    • Alzheimer disease    • Benign prostatic hypertrophy    • Bronchitis    • Cataracts, bilateral    • Cognitive communication deficit    • Depression    • Dysphagia    • Generalized anxiety disorder    • GERD (gastroesophageal reflux disease)    • Hyperlipidemia    • Malignant neoplasm of skin    • Muscle weakness    • Orthostatic hypotension    • Parkinson disease    • Peripheral neuropathy    • Personal history of transient ischemic attack (TIA), and cerebral infarction without residual deficits    • Pneumonia    • Risk for falls    • Suicidal ideation    • Suicide attempt    • TIA (transient ischemic attack)    • UTI (urinary tract infection)    • Voice disorder      Past Surgical History:   Procedure Laterality Date   • ADENOIDECTOMY     • OTHER SURGICAL HISTORY      LASER REPAIR OF RETINAL TEAR   • TONSILLECTOMY            SWALLOW EVALUATION (last 72 hours)      Swallow Evaluation       17 1125 17 1440 17 0900          Rehab Evaluation    Document Type evaluation  -RD evaluation  -RD evaluation  -LS      Subjective Information agree to therapy   responded w/ his name and agreed via ok for swallow eval  -RD no complaints  -RD no complaints  -LS       Patient Effort, Rehab Treatment poor  -RD fair  -RD       General Information    Patient Profile Review yes  -RD yes  -RD yes  -LS      Onset of Illness/Injury 06/11/17  -RD 06/11/17  -RD       Subjective Patient Observations arousable, alert for eval  -RD Intermittently alert, lethargic  -RD       Pertinent History Of Current Problem Adm w/ hypoxia, PNA, respiratory insufficiency. Hx of Parkinson's. CXR: min bilateral mid & lower lung zpne linear & groundglass opacities. L>R w/ atelectasis/scarring & min pneumonitis; no focal consolidation. Suspect aspiration per MD note.  -RD Adm w/ hypoxia, PNA, respiratory insufficiency. Hx of Parkinson's. CXR: min bilateral mid & lower lung zpne linear & groundglass opacities. L>R w/ atelectasis/scarring & min pneumonitis; no focal consolidation. Suspect aspiration per MD note.  -RD       Current Diet Limitations NPO  -RD NPO  -RD       Precautions/Limitations, Vision WFL;other (see comments)   for purposes of eval  -RD WFL;other (see comments)   for purposes of eval  -RD other (see comments);WFL   functional for eval  -LS      Precautions/Limitations, Hearing WFL;other (see comments)   for purposes of eval  -RD WFL;other (see comments)   for purposes of eval  -RD other (see comments);WFL   for eval  -LS      Prior Level of Function- Communication other (comment)   baseline dementia, dependent w/ ADLs  -RD other (comment)   baseline dementia, dependent w/ ADLs  -RD other (comment)   dementia at baseline  -LS      Prior Level of Function- Swallowing diet modifications- foods  -RD diet modifications- foods;other (comment)   per pt's wife  -RD no diet consistency restrictions  -LS      Plans/Goals Discussed With patient and family;agreed upon  -RD patient and family;agreed upon  -RD patient  -LS      Barriers to Rehab medically complex  -RD medically complex  -RD medically complex  -LS      Clinical Impression    Patient's Goals For Discharge patient did not state  -RD patient  did not state  -RD patient did not state  -LS      Family Goals For Discharge patient able to return to PO diet  -RD patient able to return to PO diet  -RD       SLP Swallowing Diagnosis other (see comments)   suspect pharyngeal dysphagia  -RD other (see comments)   r/o pharyngeal dysphagia  -RD other (see comments)   r/o pharyngeal dysphagia  -LS      Rehab Potential/Prognosis, Swallowing fair, will monitor progress closely  -RD fair, will monitor progress closely  -RD fair, will monitor progress closely  -LS      Criteria for Skilled Therapeutic Interventions Met skilled criteria for dysphagia intervention met  -RD skilled criteria for dysphagia intervention met  -RD skilled criteria for dysphagia intervention met  -LS      FCM, Swallowing 1-->Level 1  -RD 1-->Level 1  -RD 1-->Level 1  -LS      Therapy Frequency PRN  -RD evaluation only  -RD       Predicted Duration Therapy Interv (days) until discharge  -RD        SLP Diet Recommendation NPO: unsafe for food/liquid intake  -RD NPO: unsafe for food/liquid intake  -RD NPO: unsafe for food/liquid intake  -LS      Recommended Diagnostics  reassess via clinical swallow (non-instrumental exam)  -RD reassess via clinical swallow (non-instrumental exam)  -LS      SLP Rec. for Method of Medication Administration meds via alternate route  -RD meds via alternate route  -RD meds via alternate route  -LS      Pain Assessment    Pain Assessment Vila-Lawrence FACES  -RD No/denies pain  -RD       Vila-[place]rence FACES Pain Rating 0  -RD        Oral Motor Structure and Function    Oral Motor Anatomy and Physiology patient demonstrates anatomy that is WNL  -RD patient demonstrates anatomy that is WNL  -RD       Dentition Assessment present and adequate  -RD present and adequate  -RD present and adequate  -LS      Secretion Management wet vocal quality  -RD gagging or choking on secretions  -RD WNL/WFL  -LS      Mucosal Quality moist, healthy  -RD moist, healthy  -RD moist, healthy  -LS       Volitional Swallow mild to moderate difficulties initiating volitional swallow  -RD mild to moderate difficulties initiating volitional swallow  -RD mild to moderate difficulties initiating volitional swallow  -LS      Volitional Cough weak volitional cough  -RD weak volitional cough  -RD weak volitional cough  -LS      Oral Musculature General Assessment other (see comments)   generalized oral weakness  -RD other (see comments)   generalized oral weakness  -RD       General Feeding/Swallowing Observations    Current Feeding Method NPO  -RD NPO  -RD       Respiratory Support Currently in Use nasal cannula in use  -RD nasal cannula in use  -RD       Flow (L/min) 2  -RD 2  -RD       Clinical Swallow Exam    Mode of Presentation fed by clinician;spoon  -RD fed by clinician;spoon  -RD       Oral Preparation Concerns thin:;anterior loss;pudding:;increased prep time  -RD thin:;pudding:;increased prep time  -RD       Respiratory Concerns decreased control/incoordination of breathing with swallow  -RD decreased control/incoordination of breathing with swallow  -RD       Oral Phase Results impaired oral phase, signs of dysfunction present  -RD impaired oral phase, signs of dysfunction present  -RD impaired oral phase, signs of dysfunction present   increased prep time w/ ice chips  -LS      Pharyngeal Phase Results no signs/symptoms of pharyngeal impairment;cough;other (see comments)   incr'd wet-gurgled vocal quality w/ all PO trials  -RD sign/symptoms of pharyngeal impairment;cough;susupected impaired pharyngeal phase of swallowing  -RD sign/symptoms of pharyngeal impairment;cough  -LS      Summary of Clinical Exam Pt arousable and maintained alertness during eval today. RN reports pt verbally responding more today. Generalized oral weakness. Wet-gurgled vocal quality at baseline. Trials of thins via ice, 1/2 tsp of thins, and 1/2 tsp of puree. Overt s/s of aspiration c/b weak coughing and incr'd wet breathing w/ all  trials. Pt able to maintain O2 sats at 95% during and after PO trials. Pt still w/ severe s/s and at high risk for aspiration, not yet ready for instrumental eval. ST will f/u in dyspahgia tx for instrumental readiness. RECS: NPO, meds alt route, F/u for instrumental readiness in dysphagia tx PRN.   -RD Repeat clinical dysphagia eval complete. Pt opened eyes to participate, but lethargic. Generalized oral weakness. Trials of thins via ice and 1/2 tsp and 1/2 tsp of puree. Overt s/s of aspiration w/ all trials c/b coughing and wet vocal quality. Pt w/ wet-gurgled vocal quality at baseline. Pt noted w/ O2 desat to 79% after PO trials, returned to 86% O2 prior to leaving pt's room. Pt at high risk for aspiration, not ready for instrumental eval. RECS: NPO, meds alt route, BS re-eval as appropriate  -RD R/o pharyngeal dysphagia. Pt opened eyes to stimulation. Increased prep time with ice chip. Cough w/ ice chips x2. Wet breath sounds at baseline. Pt is at high risk for asp.REC: NPO, meds via alt route, re-assess 6/12  -      Swallow Recommendations    Oral Care oral care with toothbrush and dentifrice BID and PRN  -RD oral care with toothbrush and dentifrice BID and PRN  -RD       Other Recommendations  repeat clincial exam  -RD       Recommended Diet NPO: unsafe for food/liquid intake  -RD NPO: unsafe for food/liquid intake  -RD         User Key  (r) = Recorded By, (t) = Taken By, (c) = Cosigned By    Initials Name Effective Dates     Merle Stephens, MS [facility]-SLP 06/22/15 -     RD Landy Lemon, MS CF-SLP 09/18/16 -         EDUCATION  The patient has been educated in the following areas:   Dysphagia (Swallowing Impairment) Oral Care/Hydration NPO rationale.    SLP Recommendation and Plan  SLP Swallowing Diagnosis: other (see comments) (suspect pharyngeal dysphagia)  SLP Diet Recommendation: NPO: unsafe for food/liquid intake     SLP Rec. for Method of Medication Administration: meds via alternate route         Criteria for Skilled Therapeutic Interventions Met: skilled criteria for dysphagia intervention met     Rehab Potential/Prognosis, Swallowing: fair, will monitor progress closely  Therapy Frequency: PRN             Plan of Care Review  Plan Of Care Reviewed With: patient, spouse  Progress: no change  Outcome Summary/Follow up Plan: Pt arousable and maintained alertness during eval today. RN reports pt verbally responding more today. Generalized oral weakness. Wet-gurgled vocal quality at baseline. Trials of thins via ice, 1/2 tsp of thins, and 1/2 tsp of puree. Overt s/s of aspiration c/b weak coughing and incr'd wet breathing w/ all trials. Pt able to maintain O2 sats at 95% during and after PO trials. Pt still w/ severe s/s and at high risk for aspiration, not yet ready for instrumental eval. ST will f/u in dyspahgia tx for instrumental readiness. RECS: NPO, meds alt route, F/u for instrumental readiness in dysphagia tx PRN.           IP SLP Goals       06/13/17 1316          Repeat Evaluation Needed    Swallow Skills to Level that Repeat Evaluation Indicated- SLP, Date Established 06/13/17  -RD      Swallow Skills to Level that Repeat Evaluation Indicated- SLP, Time to Achieve by discharge  -RD      Swallow Skills to Level that Repeat Evaluation Indicated- SLP, Date Goal Reviewed 06/13/17  -RD      Swallow Skills to Level that Repeat Evaluation Indicated- SLP, Outcome goal ongoing  -RD        User Key  (r) = Recorded By, (t) = Taken By, (c) = Cosigned By    Initials Name Provider Type    NICOLE Lemon MS CF-SLP Speech and Language Pathologist             SLP Outcome Measures (last 72 hours)      SLP Outcome Measures       06/13/17 1300 06/12/17 1500       SLP Outcome Measures    Outcome Measure Used? Adult NOMS  -RD Adult NOMS  -RD     FCM Scores    FCM Chosen Swallowing  -RD Swallowing  -RD     Swallowing FCM Score 1  -RD 1  -RD       User Key  (r) = Recorded By, (t) = Taken By, (c) = Cosigned By     Initials Name Effective Dates    NICOLE Landy E Ion MS CF-SLP 09/18/16 -            Time Calculation:         Time Calculation- SLP       06/13/17 1320          Time Calculation- SLP    SLP Start Time 1125  -RD      SLP Received On 06/13/17  -        User Key  (r) = Recorded By, (t) = Taken By, (c) = Cosigned By    Initials Name Provider Type    NICOLE Bill SWEETIE Ion MS CF-SLP Speech and Language Pathologist          Therapy Charges for Today     Code Description Service Date Service Provider Modifiers Qty    31089075634 HC ST EVAL ORAL PHARYNG SWALLOW 6 6/12/2017 Landy Lemon MS CF-SLP GN 1    [id number] HC ST EVAL ORAL PHARYNG SWALLOW 4 6/13/2017 Landy Lemon MS CF-SLP GN 1               Landy Lemon MS CF-SLP  6/13/2017

## 2017-06-13 NOTE — THERAPY DISCHARGE NOTE
"Acute Care - Physical Therapy Initial Eval/Discharge  [facility]     Patient Name: Victor Hugo Dumont  : 1928  MRN: [id number]  Today's Date: 2017   Onset of Illness/Injury or Date of Surgery Date: 17  Date of Referral to PT: 17  Referring Physician: MD Wilfrid      Admit Date: 2017    Visit Dx:    ICD-10-CM ICD-9-CM   1. Hypoxia R09.02 799.02   2. Pneumonia of right lower lobe due to infectious organism J18.9 483.8   3. Dysphagia, unspecified R13.10 787.20   4. Impaired functional mobility, balance, gait, and endurance Z74.09 V49.89     Patient Active Problem List   Diagnosis   • Pneumonia   • Parkinson disease   • Dysphagia   • Hypoxia   • Acute respiratory failure with hypoxia     Past Medical History:   Diagnosis Date   • Abnormal posture    • Alzheimer disease    • Benign prostatic hypertrophy    • Bronchitis    • Cataracts, bilateral    • Cognitive communication deficit    • Depression    • Dysphagia    • Generalized anxiety disorder    • GERD (gastroesophageal reflux disease)    • Hyperlipidemia    • Malignant neoplasm of skin    • Muscle weakness    • Orthostatic hypotension    • Parkinson disease    • Peripheral neuropathy    • Personal history of transient ischemic attack (TIA), and cerebral infarction without residual deficits    • Pneumonia    • Risk for falls    • Suicidal ideation    • Suicide attempt    • TIA (transient ischemic attack)    • UTI (urinary tract infection)    • Voice disorder      Past Surgical History:   Procedure Laterality Date   • ADENOIDECTOMY     • OTHER SURGICAL HISTORY      LASER REPAIR OF RETINAL TEAR   • TONSILLECTOMY            PT ASSESSMENT (last 72 hours)      PT Evaluation       17 0820 17 0800    Rehab Evaluation    Document Type evaluation  -MB     Subjective Information unable to respond   Pt. only stated, \"Mornin.\"  -MB     Patient Effort, Rehab Treatment poor  -MB     Symptoms Noted During/After Treatment none  -MB     General " Information    Patient Profile Review yes  -MB     Onset of Illness/Injury or Date of Surgery Date 06/11/17  -MB     Referring Physician MD Wilfrid  -MB     General Observations Pt. supine w/ 2 L/min O2 NC, IV intact R UE, telemetry, pulse ox.  No family present at bedside.  Nsg consent for PT.  -MB     Pertinent History Of Current Problem Pt. adm via EMS w/ respiratory difficulty, fever, hypoxia, sepsis, pneumonia.  Pt. w/ h/o Parkinsons w/ advanced dementia.  -MB     Precautions/Limitations fall precautions;NPO;oxygen therapy device and L/min  -MB     Prior Level of Function dependent:;bed mobility;ADL's;transfer  -MB     Equipment Currently Used at Home hospital bed;walker, rolling  -MB     Plans/Goals Discussed With other   No family present.  PT plan discussed w/ nsg.  -MB     Risks Reviewed patient:;increased discomfort;change in vital signs  -MB     Benefits Reviewed patient:;improve function  -MB     Barriers to Rehab medically complex;previous functional deficit;cognitive status;contractures  -MB     Living Environment    Lives With facility resident  -MB     Living Arrangements extended care facility  -MB     Clinical Impression    Date of Referral to PT 06/12/17  -MB     PT Diagnosis Debility  -MB     Functional Level At Time Of Evaluation Pt. dependent for all mobility.  -MB     Patient/Family Goals Statement Pt. unable to state.  No family present.  -MB     Criteria for Skilled Therapeutic Interventions Met no;current level of function same as previous level of function;no significant expected improvement in functional status  -MB     Vital Signs    Pre Systolic BP Rehab 162  -MB     Pre Treatment Diastolic BP 76  -MB     Pretreatment Heart Rate (beats/min) 57  -MB     Pre SpO2 (%) 94  -MB     O2 Delivery Pre Treatment supplemental O2   2L  -MB     Pre Patient Position Supine  -MB     Intra Patient Position Supine  -MB     Post Patient Position Supine  -MB     Pain Assessment    Pain Assessment  Vila-Lawrence FACES  -MB     Vila-[place]rence FACES Pain Rating 0  -MB     Cognitive Assessment/Intervention    Current Cognitive/Communication Assessment impaired  -MB     Orientation Status unable/difficult to assess  -MB     Follows Commands/Answers Questions unable to answer questions  -MB     Personal Safety severe impairment;unable/difficult to assess  -MB     Personal Safety Interventions fall prevention program maintained  -MB     ROM (Range of Motion)    General ROM upper extremity range of motion deficits identified;lower extremity range of motion deficits identified  -MB     General ROM Detail L UE wrist ext limited 75%; L knee ext ROM limited 25%.    Per family, (via OT), pt utilizes carrot splint for L hand.   -MB     Muscle Tone Assessment    Muscle Tone Assessment LUE  -MB LUE  -LISSA    LUE Muscle Tone Assessment severely increased tone  -MB severely increased tone  -LISSA    Bilateral Upper Extremities Muscle Tone Assessment  rigidity   tremors  -LISSA    Bed Mobility, Assessment/Treatment    Bed Mobility, Assistive Device draw sheet  -MB     Bed Mobility, Roll Left, Bonner dependent (less than 25% patient effort);2 person assist required  -MB     Bed Mobility, Roll Right, Bonner dependent (less than 25% patient effort);2 person assist required  -MB     Bed Mobility, Scoot/Bridge, Bonner dependent (less than 25% patient effort);2 person assist required  -MB     Bed Mob, Supine to Sit, Bonner not tested  -MB     Bed Mobility, Safety Issues cognitive deficits limit understanding;decreased use of arms for pushing/pulling;decreased use of legs for bridging/pushing;impaired trunk control for bed mobility  -MB     Bed Mobility, Impairments decreased flexibility;muscle tone abnormal;ROM decreased;strength decreased;impaired balance;coordination impaired;motor control impaired  -MB     Bed Mobility, Comment Pt. requires dep A x 2 for bed mobility.  -MB     Transfer Assessment/Treatment    Transfers,  Bed-Chair [place] not tested  -MB     Transfer, Comment Recommend mechanical lift transfers to recliner w/ nsg, when medically appropriate.  -MB     Gait Assessment/Treatment    Gait, [place] Level not appropriate to assess;unable to perform  -MB     Therapy Exercises    Bilateral Lower Extremities PROM:;supine;calf stretch;hip abduction/adduction;hip flexion;hip IR;hip ER  -MB     Positioning and Restraints    Pre-Treatment Position in bed  -MB     Post Treatment Position bed  -MB     In Bed notified nsg;supine;call light within reach;encouraged to call for assist;exit alarm on  -MB       06/13/17 0600 06/13/17 0400    Muscle Tone Assessment    Bilateral Upper Extremities Muscle Tone Assessment rigidity   tremors  -TA rigidity   tremors  -TA      06/13/17 0200 06/13/17 0000    Muscle Tone Assessment    Bilateral Upper Extremities Muscle Tone Assessment rigidity   tremors  -TA rigidity   tremors  -TA      06/12/17 2200 06/12/17 2000    Muscle Tone Assessment    Bilateral Upper Extremities Muscle Tone Assessment rigidity   tremors  -TA rigidity   tremors  -TA      06/12/17 1600 06/12/17 1440    Rehab Evaluation    Document Type  evaluation  -RD    Subjective Information  no complaints  -RD    Patient Effort, Rehab Treatment  fair  -RD    Pain Assessment    Pain Assessment  No/denies pain  -RD    Pain Score 0  -GM       06/12/17 1306 06/12/17 1045    Rehab Evaluation    Evaluation Not Performed patient/family declined evaluation  -SAM     Evaluation Not Performed, Comment per wife pt. totally dependent all ADL's and lift to chair.  Per wife did not feel therapy would help.  -SAM     General Information    Equipment Currently Used at Home  hospital bed;walker, rolling  -SP    Living Environment    Lives With  facility resident  -SP    Living Arrangements  extended care facility  -SP    Home Accessibility  no concerns  -SP    Stair Railings at Home  none  -SP    Type of Financial/Environmental Concern  none  -SP     Transportation Available  ambulance  -SP      06/12/17 0800 06/12/17 0600    Muscle Tone Assessment    Bilateral Upper Extremities Muscle Tone Assessment rigidity   tremors  -LISSA --   tremors  -TA      06/12/17 0400 06/12/17 0200    Muscle Tone Assessment    Bilateral Upper Extremities Muscle Tone Assessment --   tremors  -TA --   tremors  -TA      06/12/17 0000 06/11/17 2200    Muscle Tone Assessment    Bilateral Upper Extremities Muscle Tone Assessment --   tremors  -TA rigidity   tremors  -TA      06/11/17 0900       Rehab Evaluation    Document Type evaluation  -LS     Subjective Information no complaints  -LS       User Key  (r) = Recorded By, (t) = Taken By, (c) = Cosigned By    Initials Name Provider Type    SAM Suzanne Herrera, OT Occupational Therapist    LS Merle Stephens, MS CCC-SLP Speech and Language Pathologist    TA Anastasia Blandon, RN Registered Nurse    DREW Walters, RN Registered Nurse    GRIFFIN Freitas, PT Physical Therapist    SP Micki Gage, PATTI Case Manager    LISSA Alarcon, RN Registered Nurse    NICOLE Lemon, MS CF-SLP Speech and Language Pathologist              PT Recommendation and Plan  PT Frequency: evaluation only  Plan of Care Review  Plan Of Care Reviewed With: patient  Progress: no change  Outcome Summary/Follow up Plan: PT eval completed.  Pt. unable to follow commands and required dep A x 2 for bed mob.  At baseline, patient is dependent for all mobility and SNF utilizes lift system for transfers.  Pt. appears at baseline functional status and no skilled PT services indicated at this time.                  Outcome Measures       06/13/17 0820          How much help from another person do you currently need...    Turning from your back to your side while in flat bed without using bedrails? 1  -MB      Moving from lying on back to sitting on the side of a flat bed without bedrails? 1  -MB      Moving to and from a bed to a chair (including a wheelchair)? 1   -MB      Standing up from a chair using your arms (e.g., wheelchair, bedside chair)? 1  -MB      Climbing 3-5 steps with a railing? 1  -MB      To walk in hospital room? 1  -MB      AM-PAC 6 Clicks Score 6  -MB      Functional Assessment    Outcome Measure Options AM-PAC 6 Clicks Basic Mobility (PT)  -MB        User Key  (r) = Recorded By, (t) = Taken By, (c) = Cosigned By    Initials Name Provider Type    GRIFFIN Freitas PT Physical Therapist           Time Calculation:         PT Charges       06/13/17 1007          Time Calculation    Start Time 0820  -MB      PT Received On 06/13/17  -MB        User Key  (r) = Recorded By, (t) = Taken By, (c) = Cosigned By    Initials Name Provider Type    GRIFFIN Freitas PT Physical Therapist          Therapy Charges for Today     Code Description Service Date Service Provider Modifiers Qty    67310638903  PT MOBILITY CURRENT 6/13/2017 Danielle Freitas, PT GP, CN 1    [id number] HC PT MOBILITY PROJECTED 6/13/2017 Danielle Freitas, PT GP, CN 1    [id number]  PT EVAL MOD COMPLEXITY 3 6/13/2017 Danielle Freitas, PT GP 1          PT G-Codes  PT Professional Judgement Used?: Yes  Outcome Measure Options: AM-PAC 6 Clicks Basic Mobility (PT)  Score: 6  Functional Limitation: Mobility: Walking and moving around  Mobility: Walking and Moving Around Current Status (): 100 percent impaired, limited or restricted  Mobility: Walking and Moving Around Goal Status (): 100 percent impaired, limited or restricted         Danielle Freitas PT  6/13/2017

## 2017-06-13 NOTE — PLAN OF CARE
Problem: Patient Care Overview (Adult)  Goal: Plan of Care Review  Outcome: Outcome(s) achieved Date Met:  06/13/17 06/13/17 0959   Coping/Psychosocial Response Interventions   Plan Of Care Reviewed With patient   Outcome Evaluation   Outcome Summary/Follow up Plan PT eval completed. Pt. unable to follow commands and required dep A x 2 for bed mob. At baseline, patient is dependent for all mobility and SNF utilizes lift system for transfers. Pt. appears at baseline functional status and no skilled PT services indicated at this time.    Patient Care Overview   Progress no change

## 2017-06-13 NOTE — PROGRESS NOTES
"Palliative Care Progress Note    Date of Admission: 6/11/2017    Subjective:  Patient remains minimally to unresponsive.  Family feels that he is been fairly comfortable but not improving.  No current facility-administered medications on file prior to encounter.      No current outpatient prescriptions on file prior to encounter.       Pharmacy to dose vancomycin     sodium chloride 100 mL/hr Last Rate: 100 mL/hr (06/11/17 2300)     •  acetaminophen  •  acetaminophen  •  ipratropium-albuterol  •  ondansetron **OR** ondansetron  •  Pharmacy to dose vancomycin  •  sodium chloride  •  Insert peripheral IV **AND** sodium chloride    Objective: /76 (BP Location: Left arm, Patient Position: Lying)  Pulse 58  Temp (!) 100.7 °F (38.2 °C) (Axillary)  Comment: notified nurse  Resp 20  Ht 72\" (182.9 cm)  Wt 214 lb (97.1 kg)  SpO2 94%  BMI 29.02 kg/m2   No intake or output data in the 24 hours ending 06/13/17 1133  Physical Exam:      General Appearance:    Minimally responsive    Head:    Normocephalic, without obvious abnormality, atraumatic   Eyes:            Lids and lashes normal, conjunctivae and sclerae normal, no   icterus, no pallor, corneas clear, PERRLA   Ears:    Ears appear intact with no abnormalities noted   Throat:   No oral lesions, no thrush, oral mucosa moist   Neck:   No adenopathy, supple, trachea midline, no thyromegaly, no   carotid bruit, no JVD   Back:     No kyphosis present, no scoliosis present, no skin lesions,      erythema or scars, no tenderness to percussion or                   palpation,   range of motion normal   Lungs:    Coarse breath sounds primarily in both lower bases     Heart:    Regular rhythm and normal rate, normal S1 and S2, no            murmur, no gallop, no rub, no click   Chest Wall:    No abnormalities observed   Abdomen:     Normal bowel sounds, no masses, no organomegaly, soft        non-tender, non-distended, no guarding, no rebound                tenderness "   Rectal:     Deferred   Extremities:   no edema, no cyanosis, no             redness   Pulses:   Pulses palpable and equal bilaterally   Skin:   No bleeding, bruising or rash   Lymph nodes:   No palpable adenopathy           Results from last 7 days  Lab Units 06/12/17  0507   WBC 10*3/mm3 9.89   HEMOGLOBIN g/dL 12.5*   HEMATOCRIT % 38.9   PLATELETS 10*3/mm3 139*       Results from last 7 days  Lab Units 06/12/17  0507 06/11/17  0802   SODIUM mmol/L 138 141   POTASSIUM mmol/L 3.6 3.6   CHLORIDE mmol/L 107 108   TOTAL CO2 mmol/L 28.0 31.0   BUN mg/dL 9 12   CREATININE mg/dL 0.70 0.80   CALCIUM mg/dL 8.7 9.1   BILIRUBIN mg/dL  --  1.0   ALK PHOS U/L  --  67   ALT (SGPT) U/L  --  11   AST (SGOT) U/L  --  16   GLUCOSE mg/dL 87 88       Impression: Dyspnea  Pneumonia  Parkinson's  Dysphagia  Goals of care  Plan: Did have a brief discussion with patient's wife.  She is going to set up a meeting for tomorrow with her son.  We will plan on trying to me to family around noon tomorrow.        Dionisio Edward DO  06/13/17  11:33 AM

## 2017-06-13 NOTE — PROGRESS NOTES
[facility] Medicine Services    ASSESSMENT / PLAN          1. Acute hypoxic respiratory failure, sepsis present on admission , Pneumonia: Healthcare associated probably aspiration , continue Vanc and Zosyn in ED. Will continue. PRN nebs Cultures pending. UA pending as well.  Failed swallow evaluation had a long discussion with the wife and the family other family members  this morning as well if no further improvement over next 24  hours , will consider full comfort measures no artificial feeding, family meeting tomorrow.   2.Parkinsons Disease with advanced dementia: Need to hold meds for now given the dysphagia and suspected aspiration.  3. Dysphagia:   NPO for now.     DVT prophylaxis:low dose lovenox  Code Status: DNR      Condition--serious  Prognosis-- guarded  Expected Discharge disposition in        to be decided    SUBJECTIVE--HPI/ Events overnight / CC- Hospital Follow up visit/ ROS-not detailed ,  as performed below    Looking same as yesterday , obtunded, op[en eyes briefly  according to the wife at bedside she does not want to consider any artificial feeding   failed swallow evaluation again     OBJECTIVE        Vital Sign Min/Max for last 24 hours  Temp  Min: 99.5 °F (37.5 °C)  Max: 100.7 °F (38.2 °C)   BP  Min: 147/70  Max: 162/76   Pulse  Min: 54  Max: 65   Resp  Min: 18  Max: 22   No Data Recorded   Flow (L/min)  Min: 2  Max: 2    No intake or output data in the 24 hours ending 06/13/17 1257        Gen/Lying in bed obtunded only open eyes to painful stimuli not following commands    CV-RR tachycardia, S1 S2 normal, no m/r/g  Resp-bilateral diffuse coarse upper respiratory sounds, no wheezes  Abd-soft, NT, ND, +BS  Ext-no edema  Neuro-lethargic oriented ×0,   Not able to assess  mood  Skin, no new rashes over last 24 hours    Medications    aspirin 81 mg Oral Daily   carbidopa-levodopa 1 tablet Oral 4x Daily   citalopram 20 mg Oral Daily   docusate sodium 100 mg Oral BID    donepezil 10 mg Oral Nightly   enoxaparin 40 mg Subcutaneous Daily   famotidine 20 mg Oral BID   finasteride 5 mg Oral Daily   memantine 10 mg Oral BID   piperacillin-tazobactam 4.5 g Intravenous Q6H   terazosin 2 mg Oral Nightly   vancomycin 15 mg/kg Intravenous Q12H     Meds Prn  •  acetaminophen  •  acetaminophen  •  ipratropium-albuterol  •  ondansetron **OR** ondansetron  •  Pharmacy to dose vancomycin  •  sodium chloride  •  Insert peripheral IV **AND** sodium chloride    I have reviewed the labs, culture data, radiology results, and diagnostic studies.    Results from last 7 days  Lab Units 06/12/17  0507 06/11/17  0802 06/11/17  0256   SODIUM mmol/L 138 141 138   POTASSIUM mmol/L 3.6 3.6 3.7   CHLORIDE mmol/L 107 108 104   TOTAL CO2 mmol/L 28.0 31.0 28.0   BUN mg/dL 9 12 15   CREATININE mg/dL 0.70 0.80 0.80   CALCIUM mg/dL 8.7 9.1 9.0   BILIRUBIN mg/dL  --  1.0 0.8   ALK PHOS U/L  --  67 59   ALT (SGPT) U/L  --  11 2*   AST (SGOT) U/L  --  16 11   GLUCOSE mg/dL 87 88 129*       Results from last 7 days  Lab Units 06/12/17  0507 06/11/17  0802 06/11/17  0256   WBC 10*3/mm3 9.89 10.91* 11.70*   HEMOGLOBIN g/dL 12.5* 13.6 13.4   HEMATOCRIT % 38.9 42.1 40.3   PLATELETS 10*3/mm3 139* 153 178           Culture Data:    Radiology Results:  Imaging Results (last 24 hours)     ** No results found for the last 24 hours. **        *. Please note that portions of this note were completed with a voice recognition program. Efforts were made to edit the dictations, but occasionally words are mistranscribed.  Abhijeet Rahman MD06/13/1712:57 PM

## 2017-06-13 NOTE — NURSING NOTE
Pt with bedbound status, high risk for skin breakdown, low salima.  Dolphin mattress with regular bed frame ordered from [facility] [phone]

## 2017-06-14 NOTE — SIGNIFICANT NOTE
1300 Palliative Team Meeting  Dr. Dionisio Juarez, APRN, [facility]  Tram Haas, APRN, Hospice  Jana Florez, RN, PN  Olimpia Johnson, [facility]  Kasie Larios, Hospice Nurse and Case Management

## 2017-06-14 NOTE — PROGRESS NOTES
"Palliative Care Progress Note    Date of Admission: 6/11/2017    Subjective:  Patient remains minimally to unresponsive.  Family feels that he is been fairly comfortable but not improving.  No current facility-administered medications on file prior to encounter.      No current outpatient prescriptions on file prior to encounter.        •  acetaminophen  •  acetaminophen  •  glycopyrrolate  •  ipratropium-albuterol  •  LORazepam  •  Morphine  •  ondansetron **OR** ondansetron  •  sodium chloride  •  Insert peripheral IV **AND** sodium chloride    Objective: /81 (BP Location: Left arm, Patient Position: Lying)  Pulse 65  Temp 97.7 °F (36.5 °C) (Oral)   Resp 20  Ht 72\" (182.9 cm)  Wt 214 lb (97.1 kg)  SpO2 93%  BMI 29.02 kg/m2     Intake/Output Summary (Last 24 hours) at 06/14/17 1253  Last data filed at 06/14/17 0555   Gross per 24 hour   Intake              700 ml   Output                0 ml   Net              700 ml     Physical Exam:      General Appearance:    Minimally responsive, somewhat restless appearing    Head:    Normocephalic, without obvious abnormality, atraumatic   Eyes:            Lids and lashes normal, conjunctivae and sclerae normal, no   icterus, no pallor, corneas clear, PERRLA   Ears:    Ears appear intact with no abnormalities noted   Throat:   No oral lesions, no thrush, oral mucosa moist   Neck:   No adenopathy, supple, trachea midline, no thyromegaly, no   carotid bruit, no JVD   Back:     No kyphosis present, no scoliosis present, no skin lesions,      erythema or scars, no tenderness to percussion or                   palpation,   range of motion normal   Lungs:    Coarse breath sounds primarily in both lower bases, Upper airway congestion noted     Heart:    Regular rhythm and normal rate, normal S1 and S2, no            murmur, no gallop, no rub, no click   Chest Wall:    No abnormalities observed   Abdomen:     Normal bowel sounds, no masses, no organomegaly, soft        " non-tender, non-distended, no guarding, no rebound                tenderness   Rectal:     Deferred   Extremities:   no edema, no cyanosis, no             redness   Pulses:   Pulses palpable and equal bilaterally   Skin:   No bleeding, bruising or rash   Lymph nodes:   No palpable adenopathy           Results from last 7 days  Lab Units 06/12/17  0507   WBC 10*3/mm3 9.89   HEMOGLOBIN g/dL 12.5*   HEMATOCRIT % 38.9   PLATELETS 10*3/mm3 139*       Results from last 7 days  Lab Units 06/12/17  0507 06/11/17  0802   SODIUM mmol/L 138 141   POTASSIUM mmol/L 3.6 3.6   CHLORIDE mmol/L 107 108   TOTAL CO2 mmol/L 28.0 31.0   BUN mg/dL 9 12   CREATININE mg/dL 0.70 0.80   CALCIUM mg/dL 8.7 9.1   BILIRUBIN mg/dL  --  1.0   ALK PHOS U/L  --  67   ALT (SGPT) U/L  --  11   AST (SGOT) U/L  --  16   GLUCOSE mg/dL 87 88       Impression: Dyspnea  Pneumonia  Parkinson's  Restless  Dysphagia  Upper airway congestion  Goals of care  Plan: I did have discussion with the patient's wife as well as son who are all at bedside.  We discussed goals of care.  After a long discussion with all agree proceeding with a comfort focus plan of care.  Family is interested in stopping antibiotics and all non-comfort medication and getting hospice involved.  I will go ahead and start patient on a one-time dose of scheduled Robamol as well as given the patient when necessary Robinul.  In respect to patient's restlessness I feel that this is being undertreated so we'll start patient some when necessary medicine.  I will have a low threshold for scheduling the patient home medicine if he continues to have some episodes of restlessness.        Dionisio Edward,   06/14/17  12:53 PM

## 2017-06-14 NOTE — PLAN OF CARE
Problem: Patient Care Overview (Adult)  Goal: Plan of Care Review  Outcome: Ongoing (interventions implemented as appropriate)    06/14/17 0246   Coping/Psychosocial Response Interventions   Plan Of Care Reviewed With patient   Outcome Evaluation   Outcome Summary/Follow up Plan Pt. still is only arousable to voice and has wet lung sounds. He is frequently incontinent and unable to follow directions.    Patient Care Overview   Progress no change

## 2017-06-14 NOTE — DISCHARGE PLACEMENT REQUEST
"Oscar Dumont (88 y.o. Male)     Date of Birth Social Security Number Address Home Phone MRN    11/09/1928  [address] [address] [phone] [id number]    Yarsanism Marital Status          Caodaism        Admission Date Admission Type Admitting Provider Attending Provider Department, Room/Bed    6/11/17 Emergency Joe Borjas MD Tovar, Jesus Victor, MD [facility] 4G, S463/1    Discharge Date Discharge Disposition Discharge Destination                      Attending Provider: Joe Borjas MD     Allergies:  No Known Allergies    Isolation:  None   Infection:  None   Code Status:  Comfort Jessy    Ht:  72\" (182.9 cm)   Wt:  214 lb (97.1 kg)    Admission Cmt:  None   Principal Problem:  Acute respiratory failure with hypoxia [J96.01]                 Active Insurance as of 6/11/2017     Primary Coverage     Payor Plan Insurance Group Employer/Plan Group    HUMANA MEDICARE REPLACEMENT HUMANA MEDICARE REPL [id number]     Payor Plan Address Payor Plan Phone Number Effective From Effective To    [address] [address] [phone] 1/1/2017     [place] [address]       Subscriber Name Subscriber Birth Date Member ID       OSCAR DUMONT 11/9/1928 [id number]                 Emergency Contacts      (Rel.) Home Phone Work Phone Mobile Phone    Lo Dumont (Spouse) [phone] -- [phone]    Too Zelaya (Son) [phone] -- [phone]            Emergency Contact Information     Name Relation Home Work Mobile    Lo Dumont Spouse [phone]    Too Zelaya Son [phone]          Insurance Information                HUMANA MEDICARE REPLACEMENT/HUMANA MEDICARE REPL Phone: [phone]    Subscriber: Oscar Dumont Subscriber#: [id number]    Group#: [id number] Precert#:              History & Physical      Parirohith Yuen MD at 6/11/2017  5:21 AM              [facility] " Medicine Services  HISTORY AND PHYSICAL    Primary Care Physician: No Known Provider    Subjective     Chief Complaint:  Short of breath    History of Present Illness:   87 yo resident of [facility] was noted to be having respiratory difficulty, temp of 100.1 and was hypoxic with O2 sats in the 80's. He was brought via EMS and has had some rales on auscultation noted along with some upper airway congestion. At baseline he is reportedly oriented to person, but has not responded to stimuli other than to open his eyes briefly since arrival. He is DNR code status per paperwork sent from [facility] signed by his wife. There is not any family present here at the hospital. He has been referred for admission to Hospital Medicine with presumed PNA. Abx started in ED. O2 sats in 90's with 2 liters O2NC.      Review of Systems   Otherwise complete ROS performed and negative except as mentioned in the HPI. Patient unable to give any information due to advanced dementia and illness    Past Medical History:   Diagnosis Date   • Abnormal posture    • Alzheimer disease    • Benign prostatic hypertrophy    • Bronchitis    • Cataracts, bilateral    • Cognitive communication deficit    • Depression    • Dysphagia    • Generalized anxiety disorder    • GERD (gastroesophageal reflux disease)    • Hyperlipidemia    • Malignant neoplasm of skin    • Muscle weakness    • Orthostatic hypotension    • Parkinson disease    • Peripheral neuropathy    • Personal history of transient ischemic attack (TIA), and cerebral infarction without residual deficits    • Pneumonia    • Risk for falls    • Suicidal ideation    • Suicide attempt    • TIA (transient ischemic attack)    • UTI (urinary tract infection)    • Voice disorder        Past Surgical History:   Procedure Laterality Date   • ADENOIDECTOMY     • OTHER SURGICAL HISTORY      LASER REPAIR OF RETINAL TEAR   • TONSILLECTOMY         History reviewed. No pertinent family history.    Social  "History     Social History   • Marital status:      Spouse name: N/A   • Number of children: N/A   • Years of education: N/A     Occupational History   • Not on file.     Social History Main Topics   • Smoking status: Unknown If Ever Smoked   • Smokeless tobacco: Not on file   • Alcohol use No   • Drug use: No   • Sexual activity: Not on file     Other Topics Concern   • Not on file     Social History Narrative   • No narrative on file       Medications:  Prescriptions Prior to Admission   Medication Sig Dispense Refill Last Dose   • acetaminophen (TYLENOL) 325 MG tablet Take 650 mg by mouth Every 4 (Four) Hours As Needed for Mild Pain (1-3).      • aspirin 81 MG chewable tablet Chew 81 mg Daily.      • carbidopa-levodopa (SINEMET)  MG per tablet Take 1 tablet by mouth 4 (Four) Times a Day. GIVE 1.5 TABLETS      • citalopram (CeleXA) 20 MG tablet Take 20 mg by mouth Daily.      • donepezil (ARICEPT) 10 MG tablet Take 10 mg by mouth Every Night.      • doxazosin (CARDURA) 2 MG tablet Take 2 mg by mouth Every Night.      • finasteride (PROSCAR) 5 MG tablet Take 5 mg by mouth Daily.      • memantine (NAMENDA) 10 MG tablet Take 10 mg by mouth 2 (Two) Times a Day.      • niacin 100 MG tablet Take 100 mg by mouth Daily With Breakfast.          Allergies:  No Known Allergies      Objective     Physical Exam:  Vital Signs: /81  Pulse 82  Temp 98.1 °F (36.7 °C) (Axillary)   Resp 18  Ht 72\" (182.9 cm)  Wt 218 lb (98.9 kg)  SpO2 94%  BMI 29.57 kg/m2  Physical Exam   Constitutional:   Frail, ill appearing elderly gentleman laying on stretcher with eyes closed. Minimally responsive to loud voice.    HENT:   Head: Normocephalic and atraumatic.     Eyes slightly matted. Dry mucus membranes.   Eyes: Pupils are equal, round, and reactive to light.   Slight matting.   Neck: No JVD present.   Limited ROM   Cardiovascular: Normal rate, regular rhythm, normal heart sounds and intact distal pulses.  Exam reveals " no gallop and no friction rub.    No murmur heard.  Pulmonary/Chest: No stridor.   Poor respiratory excursion. Scattered bilateral rales.    Abdominal: Soft. Bowel sounds are normal. He exhibits no mass. There is no guarding.   Rounded.   Musculoskeletal: He exhibits no edema, tenderness or deformity.   Neurological:   Minimally responsive to stimuli. Spastic intermittent tremors of RUE. Left sided movements less than right.   Skin: Skin is warm and dry. No rash noted. No erythema. No pallor.   Psychiatric:   Minimally responsive   Nursing note and vitals reviewed.          Results Reviewed:    Results from last 7 days  Lab Units 06/11/17  0256   WBC 10*3/mm3 11.70*   HEMOGLOBIN g/dL 13.4   PLATELETS 10*3/mm3 178       Results from last 7 days  Lab Units 06/11/17  0256   SODIUM mmol/L 138   POTASSIUM mmol/L 3.7   TOTAL CO2 mmol/L 28.0   CREATININE mg/dL 0.80   GLUCOSE mg/dL 129*   CALCIUM mg/dL 9.0       I have personally reviewed and interpreted available lab data, radiology studies and ECG obtained at time of admission.     Assessment / Plan     Assessment/Problem List:   Hospital Problem List     Pneumonia    Parkinson disease    Dysphagia (Chronic)    Hypoxia            Plan:  1. Pneumonia: CXR not impressive but presentation supports dx. Started on Vanc and Zosyn in ED. Will continue. PRN nebs Cultures pending. UA pending as well. Will get speech eval. Suspect aspiration. NPO for now, then per their recomendations.   2.Parkinsons Disease with advanced dementia: Need to hold meds for now given the dysphagia and suspected aspiration.  3. Dysphagia: Speech to see. NPO for now. Suspect aspiration  4. Hypoxia: on  Admission. Responded to supplemental o2. Likely due to #1      DVT prophylaxis:low dose lovenox  Code Status: DNR/AND (per documentation sent from [facility])  Admission Status: Patient will be admitted to (LEXOBS or LEXINPT)     PADDY Elise 06/11/17 5:57 AM      PHYSICIAN  NOTE    88-year-old male, nursing home resident, presented to the ER secondary to hypoxia and low-grade fever.  Patient has dementia plus Parkinson's disease.  Patient is a poor historian.  History is obtained from the chart.  On ED workup he was thought to have possible aspiration pneumonia-continue Zosyn and vanc started in the ED.  -Follow-up of initial chest x-ray results.  -Swallow eval in the am.  -As per nursing home papers patient is DNR will continue to honor that hopefully we can contact the family in a.m. and discuss the level of care with them.  Rest assessment and plan as per NP's note.    I have independently seen and examined the patient with ARNP and the note above reflects my changes and contributions. I have discussed with findings, diagnosis and plans with the patient and family.     Toni Yuen MD 06/11/17 6:31 AM       Please note that portions of this note may have been completed with a voice recognition program. Efforts were made to edit the dictations, but occasionally words are mistranscribed.         Electronically signed by Toni Yuen MD at 6/11/2017  6:31 AM

## 2017-06-14 NOTE — PLAN OF CARE
Problem: Patient Care Overview (Adult)  Goal: Plan of Care Review  Outcome: Ongoing (interventions implemented as appropriate)    06/14/17 1300   Coping/Psychosocial Response Interventions   Plan Of Care Reviewed With spouse;family   Outcome Evaluation   Outcome Summary/Follow up Plan Family meeting, stop aggressive care and transition to in-patient hospice   Patient Care Overview   Progress no change

## 2017-06-14 NOTE — PROGRESS NOTES
[facility] Medicine Services  INPATIENT PROGRESS NOTE    Date of Admission: 6/11/2017  Length of Stay: 3  Primary Care Physician: No Known Provider    Subjective   CC: hospital follow up   HPI:  Patient is lying in bed, unresponsive, no family at bedside.  On 02 at 2L at nasal canula. No acute distress noted.  Per nursing, no new events overnight.      Review Of Systems:   Review of Systems   Unable to perform ROS: Patient unresponsive         Objective      Temp:  [97.7 °F (36.5 °C)-100.6 °F (38.1 °C)] 97.7 °F (36.5 °C)  Heart Rate:  [55-97] 65  Resp:  [16-20] 20  BP: (157-167)/(74-81) 167/81  Physical Exam   Constitutional: He appears well-developed and well-nourished.   HENT:   Head: Normocephalic.   Cardiovascular: Normal rate and regular rhythm.    Pulmonary/Chest:   Course breath sounds, diminished bilat bases   Abdominal: Soft. Bowel sounds are normal.   Musculoskeletal: He exhibits no edema.   Neurological:   Patient unresponsive   Skin: Skin is warm and dry.   Vitals reviewed.        Results Review:    I have reviewed the labs, radiology results and diagnostic studies.      Results from last 7 days  Lab Units 06/12/17  0507   WBC 10*3/mm3 9.89   HEMOGLOBIN g/dL 12.5*   PLATELETS 10*3/mm3 139*       Results from last 7 days  Lab Units 06/12/17  0507   SODIUM mmol/L 138   POTASSIUM mmol/L 3.6   CHLORIDE mmol/L 107   TOTAL CO2 mmol/L 28.0   BUN mg/dL 9   CREATININE mg/dL 0.70   GLUCOSE mg/dL 87   CALCIUM mg/dL 8.7       Culture Data: Cultures:    Blood Culture   Date Value Ref Range Status   06/11/2017 No growth at 3 days  Preliminary   06/11/2017 No growth at 3 days  Preliminary       Radiology Data:     I have reviewed the medications.    Assessment/Plan     Problem List  Hospital Problem List     * (Principal)Acute respiratory failure with hypoxia    Pneumonia    Parkinson disease    Dysphagia (Chronic)    Hypoxia               Assessment/Plan:    Dyspnea with acute respiratory  failure/PNA/Dysphagia/Parkinsons  -Palliative care following- per nurse, has meeting with family today to discuss plan  -currently NPO d/t abnormal swallowing eval.  Family does not wish to proceed with feeding tube.  -currently with comfort measures; no PO medications being administered, IV vancomycin continuing.      Discharge Planning: Meeting with Palliative Care team today to discuss plan.    Jo Ann Smith, PADDY   06/14/17   10:26 AM

## 2017-06-14 NOTE — PROGRESS NOTES
Continued Stay Note  [facility]     Patient Name: Victor Hugo Dumont  MRN: [id number]  Today's Date: 6/14/2017    Admit Date: 6/11/2017          Discharge Plan       06/14/17 1220    Case Management/Social Work Plan    Plan Undecided    Additional Comments Consult received. Chart reviewed. Pt assessed by Hospice RN, Jess Dowling. Educated on hospice services. Family wants more time to think about it. Hospice will follow up with family tomorrow for possible inpatient hospice admission.              Discharge Codes     None        Expected Discharge Date and Time     Expected Discharge Date Expected Discharge Time    Jun 14, 2017             SELINA Simon

## 2017-06-15 PROBLEM — G20 PD (PARKINSON'S DISEASE) (HCC): Status: ACTIVE | Noted: 2017-01-01

## 2017-06-15 NOTE — PLAN OF CARE
Problem: Patient Care Overview (Adult)  Goal: Plan of Care Review  Outcome: Ongoing (interventions implemented as appropriate)    06/15/17 0431   Coping/Psychosocial Response Interventions   Plan Of Care Reviewed With patient   Outcome Evaluation   Outcome Summary/Follow up Plan Pt will be transitioned over to hospice care today 6/15/17.   Patient Care Overview   Progress declining

## 2017-06-15 NOTE — THERAPY EVALUATION
Acute Care - Speech Language Pathology   Swallow Treatment Note [facility]     Patient Name: Victor Hugo Dumont  : 1928  MRN: [id number]  Today's Date: 6/15/2017  Onset of Illness/Injury or Date of Surgery Date: 17            Admit Date: 2017    Visit Dx:      ICD-10-CM ICD-9-CM   1. Hypoxia R09.02 799.02   2. Pneumonia of right lower lobe due to infectious organism J18.9 483.8   3. Dysphagia, unspecified R13.10 787.20   4. Impaired functional mobility, balance, gait, and endurance Z74.09 V49.89     Patient Active Problem List   Diagnosis   • Pneumonia   • Parkinson disease   • Dysphagia   • Hypoxia   • Acute respiratory failure with hypoxia             Adult Rehabilitation Note       06/15/17 1125          Rehab Assessment/Intervention    Discipline speech language pathologist  -RD      Document Type therapy note (daily note)  -RD      Subjective Information unable to respond  -RD      Patient Effort, Rehab Treatment poor  -RD      Recorded by [RD] Landy Lemon MS CF-SLP      Pain Assessment    Pain Assessment [place] FACES  -RD      [place] FACES Pain Rating 0  -RD      Recorded by [RD] MS ADORE Clements-SLP      Dysphagia/Swallow Intervention    Dysphagia/Swallow Therapeutic Feed Pt intermittently alert and very lethargic today. Oral care completed. Attempted ice chip trials, however pt would not open mouth to accept PO.  -RD      Dysphagia/Swallow Caregiver Training Pt's spouse and 2 children present, educated regarding dysphagia, oral care, aspiration and aspiration precautions, safest recs and comfort diet recs. Pt's family verbalized understanding, but had not decided on comfort diet vs. safest.  -RD      Recorded by [RD] MS ADORE Clements-SLP        User Key  (r) = Recorded By, (t) = Taken By, (c) = Cosigned By    Initials Name Effective Dates    MS RU MonkSLP 16 -                   IP SLP Goals       06/15/17 1205 17 1316        Repeat Evaluation Needed    Swallow Skills to Level that Repeat Evaluation Indicated- SLP, Date Established  06/13/17  -RD     Swallow Skills to Level that Repeat Evaluation Indicated- SLP, Time to Achieve  by discharge  -RD     Swallow Skills to Level that Repeat Evaluation Indicated- SLP, Date Goal Reviewed 06/15/17  -RD 06/13/17  -RD     Swallow Skills to Level that Repeat Evaluation Indicated- SLP, Outcome goal ongoing  -RD goal ongoing  -RD     Swallow Skills to Level that Repeat Evaluation Indicated- SLP, Reason Goal Not Met progress slower than expected  -RD        User Key  (r) = Recorded By, (t) = Taken By, (c) = Cosigned By    Initials Name Provider Type    NICOLE Lemon, MS CF-SLP Speech and Language Pathologist          EDUCATION  The patient has been educated in the following areas:   Dysphagia (Swallowing Impairment) Oral Care/Hydration NPO rationale.    SLP Recommendation and Plan                                           Plan of Care Review  Plan Of Care Reviewed With: patient, spouse, family  Progress: declining  Outcome Summary/Follow up Plan: Saw pt to re-assess swallowing during tx. Pt intermittently alert and very lethargic, unable to respond to simple questions today. Oral care completed and attempted ice chip trials, however pt would not open mouth to accept. Pt w/ cont'd gurgled vocal qualty and coughing on secretions at baseline. Provided education regarding aspiration risk, oral care, safest recs, dysphagia, and options for comfort diet. Pt's family were unsure as to whether they wanted to agree to comfort diet knowing aspiration risk, as pt has not been asking for PO. RECS: Safest NPO, comfort diet recs for thins and puree, f/u for cont'd education and POC.       SLP Outcome Measures (last 72 hours)      SLP Outcome Measures       06/13/17 1300 06/12/17 1500       SLP Outcome Measures    Outcome Measure Used? Adult NOMS  -RD Adult NOMS  -RD     FCM Scores    FCM Chosen Swallowing   -RD Swallowing  -RD     Swallowing FCM Score 1  -RD 1  -RD       User Key  (r) = Recorded By, (t) = Taken By, (c) = Cosigned By    Initials Name Effective Dates    MS ADORE Monk-SLP 09/18/16 -              Time Calculation:         Time Calculation- SLP       06/15/17 1215          Time Calculation- SLP    SLP Start Time 1125  -RD      SLP Received On 06/15/17  -RD        User Key  (r) = Recorded By, (t) = Taken By, (c) = Cosigned By    Initials Name Provider Type    NICOLE Lemon MS CF-SLP Speech and Language Pathologist          Therapy Charges for Today     Code Description Service Date Service Provider Modifiers Qty    91670527083 HC ST TREATMENT SWALLOW 3 6/15/2017 Landy Lemon MS CF-SLP GN 1                 MS ADORE Clements-ALICJA  6/15/2017

## 2017-06-15 NOTE — DISCHARGE SUMMARY
[facility] Medicine Services  DISCHARGE SUMMARY       Date of Admission: 6/11/2017  Date of Discharge:  6/15/2017  Primary Care Physician: No Known Provider  Consulting Physician(s)     Provider Relationship    Dionisio Edward DO Consulting Physician          Discharge Diagnoses:  Active Hospital Problems (** Indicates Principal Problem)    Diagnosis Date Noted   • **Acute respiratory failure with hypoxia [J96.01] 06/11/2017   • Pneumonia [J18.9] 06/11/2017   • Parkinson disease [G20] 06/11/2017   • Dysphagia [R13.10] 06/11/2017   • Hypoxia [R09.02] 06/11/2017      Resolved Hospital Problems    Diagnosis Date Noted Date Resolved   No resolved problems to display.       Presenting Problem/History of Present Illness  Hypoxia [R09.02]     Hospital Course  Patient is a 88 y.o. male resident of [facility] was noted to be having respiratory difficulty, temp of 100.1 and was hypoxic with O2 sats in the 80's. He was brought to ED via EMS and had some rales on auscultation noted along with some upper airway congestion. At baseline he was reportedly oriented to person, but was not responsive to stimuli other than to open his eyes briefly since arrival. He is DNR code status per paperwork sent from [facility] signed by his wife. He was admitted to Hospital Medicine with presumed PNA. Abx started in ED. O2 sats in 90's with 2 liters O2NC.  Pt was treated for days for his PNA and mental status was not improving.  Due to suspicion of aspiration pt had swallow eval and was abnormal.  Palliative care was consulted to speak with family regarding goals of care as pt nutrition status at risk.  Family did not wish to proceed with feeding tube.  Family desired comfort care measures and Hospice was consulted and pt deemed candidate for inpatient hospice, abx were stopped and all non-comfort meds.   Care was transferred to inpatient Hospice 6/15/17.     Procedures Performed-NONE         Consults:   Consults      "Date and Time Order Name Status Description    6/12/2017 1329 Inpatient Consult to Palliative Care MD Completed           Pertinent Test Results:  Per above    Condition on Discharge:  stable    Physical Exam on Discharge:/88 (BP Location: Left arm, Patient Position: Lying)  Pulse 60  Temp 97.6 °F (36.4 °C) (Axillary)   Resp 18  Ht 72\" (182.9 cm)  Wt 215 lb 12.8 oz (97.9 kg)  SpO2 93%  BMI 29.27 kg/m2  Physical Exam  General Appearance:   Alert, cooperative, no distress  Head:   Normocephalic, atraumatic  Eyes:   Sclerae anicteric  Neck:  Supple, no mass  Lungs: Course breath sounds, diminished bilat bases , respirations unlabored  Heart: Regular rate and rhythm, S1 and S2 normal, no murmur, rub or gallop  Abdomen: Soft, non-tender, bowel sounds active, nondistended  Extremities: No clubbing, cyanosis, or edema to lower extremities  Pulses: 2+ and symmetric in distal lower extremities  Skin: No rashes   Neurologic: pt seems alert and responsive to physical stimuli, is non-verbal, unable to assess strength or focal deficits.    Discharge Disposition  Hospice/Medical Facility ([facility])    Discharge Medications   Victor Hugo Dumont   Home Medication Instructions THANG:[id number]    Printed on:06/15/17 7481   Medication Information                      acetaminophen (TYLENOL) 325 MG tablet  Take 650 mg by mouth Every 4 (Four) Hours As Needed for Mild Pain (1-3).             aspirin 81 MG chewable tablet  Chew 81 mg Daily.             carbidopa-levodopa (SINEMET)  MG per tablet  Take 1 tablet by mouth 4 (Four) Times a Day. GIVE 1.5 TABLETS             citalopram (CeleXA) 20 MG tablet  Take 20 mg by mouth Daily.             donepezil (ARICEPT) 10 MG tablet  Take 10 mg by mouth Every Night.             doxazosin (CARDURA) 2 MG tablet  Take 2 mg by mouth Every Night.             finasteride (PROSCAR) 5 MG tablet  Take 5 mg by mouth Daily.             memantine (NAMENDA) 10 MG tablet  Take 10 " mg by mouth 2 (Two) Times a Day.             niacin 100 MG tablet  Take 100 mg by mouth Daily With Breakfast.                 Discharge Diet:  per hospice    Discharge Care Plan / Instructions:  Per hospice    Activity at Discharge:   bedrest    Follow-up Appointments  No future appointments.      Test Results Pending at Discharge   Order Current Status    Blood Culture Preliminary result    Blood Culture Preliminary result           Joe Borjas MD 06/15/17 4:07 PM    Time: Discharge 20 min    Please note that portions of this note may have been completed with a voice recognition program. Efforts were made to edit the dictations, but occasionally words are mistranscribed.

## 2017-06-15 NOTE — PROGRESS NOTES
Hospitalist Daily Progress Note    Date of Admission: 6/11/2017   LOS: 4 days   PCP: No Known Provider    Chief Complaint:  hospital follow up    Subjective   History of Present Illness  Patient is lying in bed, alert but non-verbal and not answering questions, no family at bedside.   No acute distress noted. Per nursing, no new events overnight.     Review of Systems  Unable to perform ROS as pt is non-verbal    Objective   Physical Exam:  I have reviewed the vital signs.  Temp:  [98.9 °F (37.2 °C)-100.8 °F (38.2 °C)] 98.9 °F (37.2 °C)  Heart Rate:  [57] 57  Resp:  [18] 18  BP: (147)/(72) 147/72    Objective  General Appearance:    Alert, cooperative, no distress  Head:    Normocephalic, atraumatic  Eyes:    Sclerae anicteric  Neck:   Supple, no mass  Lungs: Course breath sounds, diminished bilat bases , respirations unlabored  Heart: Regular rate and rhythm, S1 and S2 normal, no murmur, rub or gallop  Abdomen:  Soft, non-tender, bowel sounds active, nondistended  Extremities: No clubbing, cyanosis, or edema to lower extremities  Pulses:  2+ and symmetric in distal lower extremities  Skin: No rashes   Neurologic: pt seems alert and responsive to physical stimuli, is non-verbal, unable to assess strength or focal deficits.    Results Review:    I have reviewed the labs, radiology results and diagnostic studies.      Results from last 7 days  Lab Units 06/12/17  0507   WBC 10*3/mm3 9.89   HEMOGLOBIN g/dL 12.5*   PLATELETS 10*3/mm3 139*       Results from last 7 days  Lab Units 06/12/17  0507   SODIUM mmol/L 138   POTASSIUM mmol/L 3.6   TOTAL CO2 mmol/L 28.0   CREATININE mg/dL 0.70   GLUCOSE mg/dL 87       I have reviewed the medications.    ---------------------------------------------------------------------------------------------  Assessment/Plan   Assessment & Plan  Assessment/Problem List    Principal Problem:    Acute respiratory failure with hypoxia  Active Problems:    Pneumonia    Parkinson disease     Dysphagia    Hypoxia       Plan  Dyspnea with acute respiratory failure/PNA/Dysphagia/Alzheimer Dementia/Parkinsons  -Palliative care following, had meeting with family to discuss GOC, family opted to stop abx and all non-comfort meds and getting hospice involved, possible inpatient hospice admission pending today.  -currently NPO d/t abnormal swallowing eval. Family does not wish to proceed with feeding tube.  -currently with comfort measures.     CODE Status: DNR  Discharge Planning:  May be inpatient hospice today or tomorrow.    Joe Borjas MD 06/15/17 7:56 AM

## 2017-06-15 NOTE — PLAN OF CARE
Problem: Patient Care Overview (Adult)  Goal: Plan of Care Review  Outcome: Ongoing (interventions implemented as appropriate)    06/15/17 1205   Coping/Psychosocial Response Interventions   Plan Of Care Reviewed With patient;spouse;family   Outcome Evaluation   Outcome Summary/Follow up Plan Saw pt to re-assess swallowing during tx. Pt intermittently alert and very lethargic, unable to respond to simple questions today. Oral care completed and attempted ice chip trials, however pt would not open mouth to accept. Pt w/ cont'd gurgled vocal qualty and coughing on secretions at baseline. Provided education regarding aspiration risk, oral care, safest recs, dysphagia, and options for comfort diet. Pt's family was unsure as to whether they wanted to agree to comfort diet knowing aspiration risk, as pt has not been asking for PO. RECS: Safest recs NPO, comfort diet recs for thins and puree, f/u for cont'd education and POC.   Patient Care Overview   Progress declining         Problem: Inpatient SLP  Goal: Dysphagia- Patient will improve swallowing skills to the level that a repeat evaluation is indicated  Outcome: Ongoing (interventions implemented as appropriate)    06/13/17 1316 06/15/17 1205   Repeat Evaluation Needed   Swallow Skills to Level that Repeat Evaluation Indicated- SLP, Date Established 06/13/17 --    Swallow Skills to Level that Repeat Evaluation Indicated- SLP, Time to Achieve by discharge --    Swallow Skills to Level that Repeat Evaluation Indicated- SLP, Date Goal Reviewed --  06/15/17   Swallow Skills to Level that Repeat Evaluation Indicated- SLP, Outcome --  goal ongoing   Swallow Skills to Level that Repeat Evaluation Indicated- SLP, Reason Goal Not Met --  progress slower than expected